# Patient Record
Sex: MALE | Race: BLACK OR AFRICAN AMERICAN | Employment: OTHER | URBAN - METROPOLITAN AREA
[De-identification: names, ages, dates, MRNs, and addresses within clinical notes are randomized per-mention and may not be internally consistent; named-entity substitution may affect disease eponyms.]

---

## 2020-01-01 ENCOUNTER — APPOINTMENT (OUTPATIENT)
Dept: CT IMAGING | Facility: CLINIC | Age: 70
End: 2020-01-01
Payer: COMMERCIAL

## 2020-01-01 ENCOUNTER — APPOINTMENT (OUTPATIENT)
Dept: GENERAL RADIOLOGY | Age: 70
DRG: 871 | End: 2020-01-01
Payer: MEDICARE

## 2020-01-01 ENCOUNTER — HOSPITAL ENCOUNTER (INPATIENT)
Age: 70
LOS: 1 days | DRG: 871 | End: 2020-07-25
Attending: EMERGENCY MEDICINE | Admitting: INTERNAL MEDICINE
Payer: MEDICARE

## 2020-01-01 ENCOUNTER — HOSPITAL ENCOUNTER (EMERGENCY)
Facility: CLINIC | Age: 70
Discharge: ANOTHER HOSPITAL | End: 2020-07-24
Attending: EMERGENCY MEDICINE
Payer: COMMERCIAL

## 2020-01-01 ENCOUNTER — HOSPITAL ENCOUNTER (OUTPATIENT)
Age: 70
Setting detail: SPECIMEN
Discharge: HOME | End: 2020-07-22
Payer: COMMERCIAL

## 2020-01-01 ENCOUNTER — APPOINTMENT (OUTPATIENT)
Dept: GENERAL RADIOLOGY | Facility: CLINIC | Age: 70
End: 2020-01-01
Payer: COMMERCIAL

## 2020-01-01 VITALS
WEIGHT: 244.71 LBS | DIASTOLIC BLOOD PRESSURE: 60 MMHG | SYSTOLIC BLOOD PRESSURE: 90 MMHG | RESPIRATION RATE: 18 BRPM | TEMPERATURE: 92.5 F | HEART RATE: 99 BPM

## 2020-01-01 VITALS
OXYGEN SATURATION: 80 % | WEIGHT: 258.82 LBS | SYSTOLIC BLOOD PRESSURE: 87 MMHG | TEMPERATURE: 97.7 F | HEART RATE: 138 BPM | DIASTOLIC BLOOD PRESSURE: 70 MMHG | HEIGHT: 76 IN | BODY MASS INDEX: 31.52 KG/M2

## 2020-01-01 LAB
-: ABNORMAL
ABO/RH: NORMAL
ABSOLUTE EOS #: 0 K/UL (ref 0–0.4)
ABSOLUTE EOS #: 0 K/UL (ref 0–0.4)
ABSOLUTE EOS #: 0.05 K/UL (ref 0–0.4)
ABSOLUTE IMMATURE GRANULOCYTE: 0 K/UL (ref 0–0.3)
ABSOLUTE IMMATURE GRANULOCYTE: 0.09 K/UL (ref 0–0.3)
ABSOLUTE IMMATURE GRANULOCYTE: ABNORMAL K/UL (ref 0–0.3)
ABSOLUTE LYMPH #: 0.59 K/UL (ref 1–4.8)
ABSOLUTE LYMPH #: 0.64 K/UL (ref 1–4.8)
ABSOLUTE LYMPH #: 1.03 K/UL (ref 1–4.8)
ABSOLUTE MONO #: 0.05 K/UL (ref 0.1–0.8)
ABSOLUTE MONO #: 0.14 K/UL (ref 0.1–0.8)
ABSOLUTE MONO #: 0.24 K/UL (ref 0.1–0.8)
ACTION: NORMAL
ALBUMIN SERPL-MCNC: 3.4 G/DL (ref 3.5–5.2)
ALBUMIN SERPL-MCNC: 3.6 G/DL (ref 3.5–5.2)
ALBUMIN SERPL-MCNC: 3.6 G/DL (ref 3.5–5.2)
ALBUMIN/GLOBULIN RATIO: 1.1 (ref 1–2.5)
ALBUMIN/GLOBULIN RATIO: 1.2 (ref 1–2.5)
ALBUMIN/GLOBULIN RATIO: 1.3 (ref 1–2.5)
ALLEN TEST: ABNORMAL
ALLEN TEST: ABNORMAL
ALLEN TEST: NEGATIVE
ALLEN TEST: POSITIVE
ALP BLD-CCNC: 92 U/L (ref 40–129)
ALP BLD-CCNC: 95 U/L (ref 40–129)
ALP BLD-CCNC: 97 U/L (ref 40–129)
ALT SERPL-CCNC: 10 U/L (ref 5–41)
ALT SERPL-CCNC: 15 U/L (ref 5–41)
ALT SERPL-CCNC: 36 U/L (ref 5–41)
AMORPHOUS: ABNORMAL
AMYLASE: 124 U/L (ref 28–100)
ANION GAP SERPL CALCULATED.3IONS-SCNC: 20 MMOL/L (ref 9–17)
ANION GAP SERPL CALCULATED.3IONS-SCNC: 32 MMOL/L (ref 9–17)
ANION GAP SERPL CALCULATED.3IONS-SCNC: 34 MMOL/L (ref 9–17)
ANION GAP SERPL CALCULATED.3IONS-SCNC: ABNORMAL MMOL/L (ref 9–17)
ANION GAP SERPL CALCULATED.3IONS-SCNC: ABNORMAL MMOL/L (ref 9–17)
ANION GAP: 21 MMOL/L (ref 7–16)
ANION GAP: 22 MMOL/L (ref 7–16)
ANTIBODY SCREEN: NEGATIVE
ARM BAND NUMBER: NORMAL
AST SERPL-CCNC: 166 U/L
AST SERPL-CCNC: 32 U/L
AST SERPL-CCNC: 52 U/L
BACTERIA: ABNORMAL
BASOPHILS # BLD: 0 % (ref 0–2)
BASOPHILS ABSOLUTE: 0 K/UL (ref 0–0.2)
BILIRUB SERPL-MCNC: 1.02 MG/DL (ref 0.3–1.2)
BILIRUB SERPL-MCNC: 1.8 MG/DL (ref 0.3–1.2)
BILIRUB SERPL-MCNC: 2.19 MG/DL (ref 0.3–1.2)
BILIRUBIN URINE: ABNORMAL
BLD PROD TYP BPU: NORMAL
BNP INTERPRETATION: ABNORMAL
BNP INTERPRETATION: ABNORMAL
BUN BLDV-MCNC: 57 MG/DL (ref 8–23)
BUN BLDV-MCNC: 58 MG/DL (ref 8–23)
BUN BLDV-MCNC: 60 MG/DL (ref 8–23)
BUN BLDV-MCNC: 61 MG/DL (ref 8–23)
BUN BLDV-MCNC: 63 MG/DL (ref 8–23)
BUN/CREAT BLD: ABNORMAL (ref 9–20)
CALCIUM SERPL-MCNC: 8.5 MG/DL (ref 8.6–10.4)
CALCIUM SERPL-MCNC: 8.6 MG/DL (ref 8.6–10.4)
CALCIUM SERPL-MCNC: 9 MG/DL (ref 8.6–10.4)
CALCIUM SERPL-MCNC: 9.4 MG/DL (ref 8.6–10.4)
CALCIUM SERPL-MCNC: 9.8 MG/DL (ref 8.6–10.4)
CASTS UA: ABNORMAL /LPF (ref 0–2)
CASTS UA: ABNORMAL /LPF (ref 0–2)
CHLORIDE BLD-SCNC: 84 MMOL/L (ref 98–107)
CHLORIDE BLD-SCNC: 85 MMOL/L (ref 98–107)
CHLORIDE BLD-SCNC: 85 MMOL/L (ref 98–107)
CHLORIDE BLD-SCNC: 86 MMOL/L (ref 98–107)
CHLORIDE BLD-SCNC: 87 MMOL/L (ref 98–107)
CHP ED QC CHECK: NORMAL
CHP ED QC CHECK: NORMAL
CHP ED QC CHECK: YES
CO2: 16 MMOL/L (ref 20–31)
CO2: 7 MMOL/L (ref 20–31)
CO2: 8 MMOL/L (ref 20–31)
CO2: <6 MMOL/L (ref 20–31)
CO2: <6 MMOL/L (ref 20–31)
COLOR: ABNORMAL
COMMENT UA: ABNORMAL
CREAT SERPL-MCNC: 2.44 MG/DL (ref 0.7–1.2)
CREAT SERPL-MCNC: 2.84 MG/DL (ref 0.7–1.2)
CREAT SERPL-MCNC: 2.84 MG/DL (ref 0.7–1.2)
CREAT SERPL-MCNC: 3 MG/DL (ref 0.7–1.2)
CREAT SERPL-MCNC: 3.08 MG/DL (ref 0.7–1.2)
CRYSTALS, UA: ABNORMAL /HPF
CULTURE: ABNORMAL
D-DIMER QUANTITATIVE: 11.69 MG/L FEU
DATE AND TIME: NORMAL
DIFFERENTIAL TYPE: ABNORMAL
DIRECT EXAM: ABNORMAL
DISPENSE STATUS BLOOD BANK: NORMAL
EKG ATRIAL RATE: 61 BPM
EKG Q-T INTERVAL: 392 MS
EKG QRS DURATION: 112 MS
EKG QTC CALCULATION (BAZETT): 515 MS
EKG R AXIS: -13 DEGREES
EKG T AXIS: -148 DEGREES
EKG VENTRICULAR RATE: 104 BPM
EOSINOPHILS RELATIVE PERCENT: 0 % (ref 1–4)
EOSINOPHILS RELATIVE PERCENT: 0 % (ref 1–4)
EOSINOPHILS RELATIVE PERCENT: 1 % (ref 1–4)
EPITHELIAL CELLS UA: ABNORMAL /HPF (ref 0–5)
EXPIRATION DATE: NORMAL
FIBRINOGEN: 117 MG/DL (ref 140–420)
FIO2: 100
FIO2: ABNORMAL
FIO2: ABNORMAL
GFR AFRICAN AMERICAN: 25 ML/MIN
GFR AFRICAN AMERICAN: 25 ML/MIN
GFR AFRICAN AMERICAN: 27 ML/MIN
GFR AFRICAN AMERICAN: 27 ML/MIN
GFR AFRICAN AMERICAN: 32 ML/MIN
GFR NON-AFRICAN AMERICAN: 19 ML/MIN
GFR NON-AFRICAN AMERICAN: 20 ML/MIN
GFR NON-AFRICAN AMERICAN: 21 ML/MIN
GFR NON-AFRICAN AMERICAN: 22 ML/MIN
GFR NON-AFRICAN AMERICAN: 27 ML/MIN
GFR SERPL CREATININE-BSD FRML MDRD: 24 ML/MIN
GFR SERPL CREATININE-BSD FRML MDRD: 26 ML/MIN
GFR SERPL CREATININE-BSD FRML MDRD: ABNORMAL ML/MIN/{1.73_M2}
GLUCOSE BLD-MCNC: 110 MG/DL
GLUCOSE BLD-MCNC: 110 MG/DL (ref 70–99)
GLUCOSE BLD-MCNC: 110 MG/DL (ref 75–110)
GLUCOSE BLD-MCNC: 111 MG/DL (ref 70–99)
GLUCOSE BLD-MCNC: 119 MG/DL (ref 75–110)
GLUCOSE BLD-MCNC: 130 MG/DL (ref 74–100)
GLUCOSE BLD-MCNC: 140 MG/DL (ref 75–110)
GLUCOSE BLD-MCNC: 141 MG/DL (ref 74–100)
GLUCOSE BLD-MCNC: 150 MG/DL
GLUCOSE BLD-MCNC: 150 MG/DL (ref 75–110)
GLUCOSE BLD-MCNC: 168 MG/DL
GLUCOSE BLD-MCNC: 168 MG/DL (ref 75–110)
GLUCOSE BLD-MCNC: 171 MG/DL (ref 74–100)
GLUCOSE BLD-MCNC: 173 MG/DL (ref 70–99)
GLUCOSE BLD-MCNC: 197 MG/DL (ref 74–100)
GLUCOSE BLD-MCNC: 208 MG/DL (ref 70–99)
GLUCOSE BLD-MCNC: 73 MG/DL (ref 75–110)
GLUCOSE BLD-MCNC: 94 MG/DL (ref 70–99)
GLUCOSE URINE: NEGATIVE
HCO3 VENOUS: 9 MMOL/L (ref 22–29)
HCO3 VENOUS: 9 MMOL/L (ref 24–30)
HCO3 VENOUS: 9.2 MMOL/L (ref 22–29)
HCO3 VENOUS: 9.9 MMOL/L (ref 22–29)
HCT VFR BLD CALC: 36.6 % (ref 40.7–50.3)
HCT VFR BLD CALC: 37.4 % (ref 40.7–50.3)
HCT VFR BLD CALC: 38 % (ref 40.7–50.3)
HCT VFR BLD CALC: 38.3 % (ref 40.7–50.3)
HCT VFR BLD CALC: 40.9 % (ref 41–53)
HCT VFR BLD CALC: 43.4 % (ref 40.7–50.3)
HEMOGLOBIN: 10.8 G/DL (ref 13–17)
HEMOGLOBIN: 10.9 G/DL (ref 13–17)
HEMOGLOBIN: 10.9 G/DL (ref 13–17)
HEMOGLOBIN: 11.2 G/DL (ref 13–17)
HEMOGLOBIN: 12 G/DL (ref 13.5–17.5)
HEMOGLOBIN: 12.2 G/DL (ref 13–17)
IMMATURE GRANULOCYTES: 0 %
IMMATURE GRANULOCYTES: 2 %
IMMATURE GRANULOCYTES: ABNORMAL %
INR BLD: 3.4
INR BLD: 4.2
INR BLD: 8.2
KETONES, URINE: ABNORMAL
LACTIC ACID, SEPSIS WHOLE BLOOD: 18 MMOL/L (ref 0.5–1.9)
LACTIC ACID, SEPSIS WHOLE BLOOD: 20 MMOL/L (ref 0.5–1.9)
LACTIC ACID, SEPSIS WHOLE BLOOD: 21 MMOL/L (ref 0.5–1.9)
LACTIC ACID, SEPSIS WHOLE BLOOD: 21 MMOL/L (ref 0.5–1.9)
LACTIC ACID, SEPSIS: ABNORMAL MMOL/L (ref 0.5–1.9)
LACTIC ACID, WHOLE BLOOD: 18 MMOL/L (ref 0.7–2.1)
LACTIC ACID, WHOLE BLOOD: 19 MMOL/L (ref 0.7–2.1)
LACTIC ACID: 15 MMOL/L (ref 0.5–2.2)
LACTIC ACID: 16.6 MMOL/L (ref 0.5–2.2)
LACTIC ACID: ABNORMAL MMOL/L
LEUKOCYTE ESTERASE, URINE: ABNORMAL
LIPASE: 62 U/L (ref 13–60)
LV EF: 10 %
LVEF MODALITY: NORMAL
LYMPHOCYTES # BLD: 13 % (ref 24–44)
LYMPHOCYTES # BLD: 22 % (ref 24–44)
LYMPHOCYTES # BLD: 23 % (ref 24–44)
Lab: ABNORMAL
Lab: ABNORMAL
MCH RBC QN AUTO: 28.6 PG (ref 25.2–33.5)
MCH RBC QN AUTO: 29 PG (ref 26–34)
MCH RBC QN AUTO: 29.1 PG (ref 25.2–33.5)
MCH RBC QN AUTO: 29.6 PG (ref 25.2–33.5)
MCHC RBC AUTO-ENTMCNC: 28.4 G/DL (ref 28.4–34.8)
MCHC RBC AUTO-ENTMCNC: 28.5 G/DL (ref 28.4–34.8)
MCHC RBC AUTO-ENTMCNC: 29.3 G/DL (ref 31–37)
MCHC RBC AUTO-ENTMCNC: 30.6 G/DL (ref 28.4–34.8)
MCV RBC AUTO: 102.4 FL (ref 82.6–102.9)
MCV RBC AUTO: 104.1 FL (ref 82.6–102.9)
MCV RBC AUTO: 93.4 FL (ref 82.6–102.9)
MCV RBC AUTO: 98.9 FL (ref 80–100)
MODE: ABNORMAL
MONOCYTES # BLD: 1 % (ref 1–7)
MONOCYTES # BLD: 5 % (ref 1–7)
MONOCYTES # BLD: 5 % (ref 1–7)
MORPHOLOGY: ABNORMAL
MUCUS: ABNORMAL
MYOGLOBIN: 546 NG/ML (ref 28–72)
MYOGLOBIN: 575 NG/ML (ref 28–72)
NEGATIVE BASE EXCESS, ART: 21 (ref 0–2)
NEGATIVE BASE EXCESS, VEN: 21 (ref 0–2)
NEGATIVE BASE EXCESS, VEN: 22 (ref 0–2)
NEGATIVE BASE EXCESS, VEN: 24 (ref 0–2)
NEGATIVE BASE EXCESS, VEN: 24 (ref 0–2)
NITRITE, URINE: NEGATIVE
NOTIFY: NORMAL
NRBC AUTOMATED: 1.5 PER 100 WBC
NRBC AUTOMATED: 4.9 PER 100 WBC
NRBC AUTOMATED: 6 PER 100 WBC
NRBC AUTOMATED: ABNORMAL PER 100 WBC
NUCLEATED RED BLOOD CELLS: 3 PER 100 WBC
NUCLEATED RED BLOOD CELLS: 7 PER 100 WBC
NUCLEATED RED BLOOD CELLS: 7 PER 100 WBC
O2 DEVICE/FLOW/%: ABNORMAL
O2 SAT, VEN: 42 % (ref 60–85)
O2 SAT, VEN: 59 % (ref 60–85)
O2 SAT, VEN: 64 % (ref 60–85)
O2 SAT, VEN: 68 %
OTHER OBSERVATIONS UA: ABNORMAL
PARTIAL THROMBOPLASTIN TIME: 87.1 SEC (ref 20.5–30.5)
PATIENT TEMP: 33.5
PATIENT TEMP: ABNORMAL
PCO2, VEN: 34 MM HG (ref 41–51)
PCO2, VEN: 36 MM HG (ref 39–55)
PCO2, VEN: 52.6 MM HG (ref 41–51)
PCO2, VEN: 54.2 MM HG (ref 41–51)
PDW BLD-RTO: 20.7 % (ref 11.8–14.4)
PDW BLD-RTO: 21.3 % (ref 11.8–14.4)
PDW BLD-RTO: 21.8 % (ref 11.8–14.4)
PDW BLD-RTO: 22.1 % (ref 12.5–15.4)
PH UA: 5 (ref 5–8)
PH VENOUS: 6.85 (ref 7.32–7.43)
PH VENOUS: 6.87 (ref 7.32–7.43)
PH VENOUS: 7 (ref 7.32–7.42)
PH VENOUS: 7.03 (ref 7.32–7.43)
PLATELET # BLD: 120 K/UL (ref 138–453)
PLATELET # BLD: 150 K/UL (ref 138–453)
PLATELET # BLD: 154 K/UL (ref 140–450)
PLATELET # BLD: 175 K/UL (ref 138–453)
PLATELET ESTIMATE: ABNORMAL
PMV BLD AUTO: 11.4 FL (ref 8.1–13.5)
PMV BLD AUTO: 11.6 FL (ref 8.1–13.5)
PMV BLD AUTO: 11.7 FL (ref 8.1–13.5)
PMV BLD AUTO: 9.2 FL (ref 6–12)
PO2, VEN: 40.5 MM HG (ref 30–50)
PO2, VEN: 44.2 MM HG (ref 30–50)
PO2, VEN: 52 MM HG (ref 30–50)
PO2, VEN: 58.3 MM HG (ref 30–50)
POC CHLORIDE: 96 MMOL/L (ref 98–107)
POC CHLORIDE: 98 MMOL/L (ref 98–107)
POC CREATININE: 2.93 MG/DL (ref 0.51–1.19)
POC CREATININE: 3.19 MG/DL (ref 0.51–1.19)
POC HCO3: 8.5 MMOL/L (ref 21–28)
POC HEMATOCRIT: 39 % (ref 41–53)
POC HEMATOCRIT: 39 % (ref 41–53)
POC HEMOGLOBIN: 13.1 G/DL (ref 13.5–17.5)
POC HEMOGLOBIN: 13.1 G/DL (ref 13.5–17.5)
POC IONIZED CALCIUM: 0.98 MMOL/L (ref 1.15–1.33)
POC IONIZED CALCIUM: 1.05 MMOL/L (ref 1.15–1.33)
POC LACTIC ACID: 12.3 MMOL/L (ref 0.56–1.39)
POC LACTIC ACID: 17.46 MMOL/L (ref 0.56–1.39)
POC LACTIC ACID: 18.1 MMOL/L (ref 0.56–1.39)
POC O2 SATURATION: 76 % (ref 94–98)
POC PCO2 TEMP: 47 MM HG
POC PCO2 TEMP: ABNORMAL MM HG
POC PCO2: 32.6 MM HG (ref 35–48)
POC PH TEMP: 6.91
POC PH TEMP: ABNORMAL
POC PH: 7.02 (ref 7.35–7.45)
POC PO2 TEMP: 32 MM HG
POC PO2 TEMP: ABNORMAL MM HG
POC PO2: 58.8 MM HG (ref 83–108)
POC POTASSIUM: 4.3 MMOL/L (ref 3.5–4.5)
POC POTASSIUM: 4.5 MMOL/L (ref 3.5–4.5)
POC SODIUM: 126 MMOL/L (ref 138–146)
POC SODIUM: 128 MMOL/L (ref 138–146)
POSITIVE BASE EXCESS, ART: ABNORMAL (ref 0–3)
POSITIVE BASE EXCESS, VEN: ABNORMAL (ref 0–2)
POSITIVE BASE EXCESS, VEN: ABNORMAL (ref 0–3)
POTASSIUM SERPL-SCNC: 4.6 MMOL/L (ref 3.7–5.3)
POTASSIUM SERPL-SCNC: 4.8 MMOL/L (ref 3.7–5.3)
POTASSIUM SERPL-SCNC: 5 MMOL/L (ref 3.7–5.3)
POTASSIUM SERPL-SCNC: 5.1 MMOL/L (ref 3.7–5.3)
POTASSIUM SERPL-SCNC: 5.2 MMOL/L (ref 3.7–5.3)
PRO-BNP: ABNORMAL PG/ML
PRO-BNP: ABNORMAL PG/ML
PROTEIN UA: ABNORMAL
PROTHROMBIN TIME: 33.6 SEC (ref 9–12)
PROTHROMBIN TIME: 42.1 SEC (ref 9–12)
PROTHROMBIN TIME: 79.3 SEC (ref 9–12)
RBC # BLD: 3.68 M/UL (ref 4.21–5.77)
RBC # BLD: 3.71 M/UL (ref 4.21–5.77)
RBC # BLD: 3.92 M/UL (ref 4.21–5.77)
RBC # BLD: 4.14 M/UL (ref 4.5–5.9)
RBC # BLD: ABNORMAL 10*6/UL
RBC UA: ABNORMAL /HPF (ref 0–2)
READ BACK: YES
RENAL EPITHELIAL, UA: ABNORMAL /HPF
SAMPLE SITE: ABNORMAL
SARS-COV-2, PCR: NORMAL
SARS-COV-2, RAPID: NOT DETECTED
SARS-COV-2: NORMAL
SEG NEUTROPHILS: 70 % (ref 36–66)
SEG NEUTROPHILS: 72 % (ref 36–66)
SEG NEUTROPHILS: 86 % (ref 36–66)
SEGMENTED NEUTROPHILS ABSOLUTE COUNT: 2.02 K/UL (ref 1.8–7.7)
SEGMENTED NEUTROPHILS ABSOLUTE COUNT: 3.29 K/UL (ref 1.8–7.7)
SEGMENTED NEUTROPHILS ABSOLUTE COUNT: 3.86 K/UL (ref 1.8–7.7)
SODIUM BLD-SCNC: 123 MMOL/L (ref 135–144)
SODIUM BLD-SCNC: 125 MMOL/L (ref 135–144)
SODIUM BLD-SCNC: 127 MMOL/L (ref 135–144)
SOURCE: NORMAL
SPECIFIC GRAVITY UA: 1.02 (ref 1–1.03)
SPECIMEN DESCRIPTION: ABNORMAL
SPECIMEN DESCRIPTION: ABNORMAL
T4 TOTAL: 4.4 UG/DL (ref 4.5–10.9)
TCO2 (CALC), ART: 10 MMOL/L (ref 22–29)
THYROXINE, FREE: 1.09 NG/DL (ref 0.93–1.7)
TOTAL CK: 268 U/L (ref 39–308)
TOTAL CK: 308 U/L (ref 39–308)
TOTAL CK: 322 U/L (ref 39–308)
TOTAL CO2, VENOUS: 10 MMOL/L (ref 23–30)
TOTAL CO2, VENOUS: 10 MMOL/L (ref 25–31)
TOTAL CO2, VENOUS: 11 MMOL/L (ref 23–30)
TOTAL CO2, VENOUS: 12 MMOL/L (ref 23–30)
TOTAL PROTEIN: 6.3 G/DL (ref 6.4–8.3)
TOTAL PROTEIN: 6.3 G/DL (ref 6.4–8.3)
TOTAL PROTEIN: 6.8 G/DL (ref 6.4–8.3)
TRANSFUSION STATUS: NORMAL
TRICHOMONAS: ABNORMAL
TROPONIN INTERP: ABNORMAL
TROPONIN T: ABNORMAL NG/ML
TROPONIN, HIGH SENSITIVITY: 223 NG/L (ref 0–22)
TROPONIN, HIGH SENSITIVITY: 230 NG/L (ref 0–22)
TROPONIN, HIGH SENSITIVITY: 245 NG/L (ref 0–22)
TROPONIN, HIGH SENSITIVITY: 248 NG/L (ref 0–22)
TROPONIN, HIGH SENSITIVITY: 254 NG/L (ref 0–22)
TROPONIN, HIGH SENSITIVITY: 259 NG/L (ref 0–22)
TROPONIN, HIGH SENSITIVITY: 305 NG/L (ref 0–22)
TSH SERPL DL<=0.05 MIU/L-ACNC: 29 MIU/L (ref 0.3–5)
TURBIDITY: ABNORMAL
UNIT DIVISION: 0
UNIT NUMBER: NORMAL
URINE HGB: ABNORMAL
UROBILINOGEN, URINE: NORMAL
WBC # BLD: 2.8 K/UL (ref 3.5–11)
WBC # BLD: 3.3 K/UL (ref 3.5–11.3)
WBC # BLD: 4.5 K/UL (ref 3.5–11.3)
WBC # BLD: 4.7 K/UL (ref 3.5–11.3)
WBC # BLD: ABNORMAL 10*3/UL
WBC UA: ABNORMAL /HPF (ref 0–5)
YEAST: ABNORMAL

## 2020-01-01 PROCEDURE — 82947 ASSAY GLUCOSE BLOOD QUANT: CPT

## 2020-01-01 PROCEDURE — C9113 INJ PANTOPRAZOLE SODIUM, VIA: HCPCS | Performed by: STUDENT IN AN ORGANIZED HEALTH CARE EDUCATION/TRAINING PROGRAM

## 2020-01-01 PROCEDURE — 6370000000 HC RX 637 (ALT 250 FOR IP): Performed by: STUDENT IN AN ORGANIZED HEALTH CARE EDUCATION/TRAINING PROGRAM

## 2020-01-01 PROCEDURE — 84295 ASSAY OF SERUM SODIUM: CPT

## 2020-01-01 PROCEDURE — 2700000000 HC OXYGEN THERAPY PER DAY

## 2020-01-01 PROCEDURE — 2500000003 HC RX 250 WO HCPCS: Performed by: STUDENT IN AN ORGANIZED HEALTH CARE EDUCATION/TRAINING PROGRAM

## 2020-01-01 PROCEDURE — 94761 N-INVAS EAR/PLS OXIMETRY MLT: CPT

## 2020-01-01 PROCEDURE — 74176 CT ABD & PELVIS W/O CONTRAST: CPT

## 2020-01-01 PROCEDURE — 86901 BLOOD TYPING SEROLOGIC RH(D): CPT

## 2020-01-01 PROCEDURE — 83880 ASSAY OF NATRIURETIC PEPTIDE: CPT

## 2020-01-01 PROCEDURE — P9012 CRYOPRECIPITATE EACH UNIT: HCPCS

## 2020-01-01 PROCEDURE — 2580000003 HC RX 258

## 2020-01-01 PROCEDURE — 87086 URINE CULTURE/COLONY COUNT: CPT

## 2020-01-01 PROCEDURE — 36430 TRANSFUSION BLD/BLD COMPNT: CPT

## 2020-01-01 PROCEDURE — 2500000003 HC RX 250 WO HCPCS

## 2020-01-01 PROCEDURE — 87040 BLOOD CULTURE FOR BACTERIA: CPT

## 2020-01-01 PROCEDURE — 0BH17EZ INSERTION OF ENDOTRACHEAL AIRWAY INTO TRACHEA, VIA NATURAL OR ARTIFICIAL OPENING: ICD-10-PCS | Performed by: INTERNAL MEDICINE

## 2020-01-01 PROCEDURE — 84132 ASSAY OF SERUM POTASSIUM: CPT

## 2020-01-01 PROCEDURE — 85384 FIBRINOGEN ACTIVITY: CPT

## 2020-01-01 PROCEDURE — 85379 FIBRIN DEGRADATION QUANT: CPT

## 2020-01-01 PROCEDURE — 2580000003 HC RX 258: Performed by: STUDENT IN AN ORGANIZED HEALTH CARE EDUCATION/TRAINING PROGRAM

## 2020-01-01 PROCEDURE — 85025 COMPLETE CBC W/AUTO DIFF WBC: CPT

## 2020-01-01 PROCEDURE — 82803 BLOOD GASES ANY COMBINATION: CPT

## 2020-01-01 PROCEDURE — 85018 HEMOGLOBIN: CPT

## 2020-01-01 PROCEDURE — 85610 PROTHROMBIN TIME: CPT

## 2020-01-01 PROCEDURE — 82550 ASSAY OF CK (CPK): CPT

## 2020-01-01 PROCEDURE — 82150 ASSAY OF AMYLASE: CPT

## 2020-01-01 PROCEDURE — 81001 URINALYSIS AUTO W/SCOPE: CPT

## 2020-01-01 PROCEDURE — 70450 CT HEAD/BRAIN W/O DYE: CPT

## 2020-01-01 PROCEDURE — 84439 ASSAY OF FREE THYROXINE: CPT

## 2020-01-01 PROCEDURE — 36415 COLL VENOUS BLD VENIPUNCTURE: CPT

## 2020-01-01 PROCEDURE — 83605 ASSAY OF LACTIC ACID: CPT

## 2020-01-01 PROCEDURE — 96365 THER/PROPH/DIAG IV INF INIT: CPT

## 2020-01-01 PROCEDURE — 99291 CRITICAL CARE FIRST HOUR: CPT | Performed by: INTERNAL MEDICINE

## 2020-01-01 PROCEDURE — 85014 HEMATOCRIT: CPT

## 2020-01-01 PROCEDURE — 87641 MR-STAPH DNA AMP PROBE: CPT

## 2020-01-01 PROCEDURE — 96375 TX/PRO/DX INJ NEW DRUG ADDON: CPT

## 2020-01-01 PROCEDURE — 87070 CULTURE OTHR SPECIMN AEROBIC: CPT

## 2020-01-01 PROCEDURE — 94770 HC ETCO2 MONITOR DAILY: CPT

## 2020-01-01 PROCEDURE — 84484 ASSAY OF TROPONIN QUANT: CPT

## 2020-01-01 PROCEDURE — 36556 INSERT NON-TUNNEL CV CATH: CPT

## 2020-01-01 PROCEDURE — 36600 WITHDRAWAL OF ARTERIAL BLOOD: CPT

## 2020-01-01 PROCEDURE — 6360000002 HC RX W HCPCS: Performed by: INTERNAL MEDICINE

## 2020-01-01 PROCEDURE — P9017 PLASMA 1 DONOR FRZ W/IN 8 HR: HCPCS

## 2020-01-01 PROCEDURE — 6360000002 HC RX W HCPCS: Performed by: STUDENT IN AN ORGANIZED HEALTH CARE EDUCATION/TRAINING PROGRAM

## 2020-01-01 PROCEDURE — 83874 ASSAY OF MYOGLOBIN: CPT

## 2020-01-01 PROCEDURE — 82435 ASSAY OF BLOOD CHLORIDE: CPT

## 2020-01-01 PROCEDURE — 3E033XZ INTRODUCTION OF VASOPRESSOR INTO PERIPHERAL VEIN, PERCUTANEOUS APPROACH: ICD-10-PCS | Performed by: INTERNAL MEDICINE

## 2020-01-01 PROCEDURE — 86927 PLASMA FRESH FROZEN: CPT

## 2020-01-01 PROCEDURE — 85730 THROMBOPLASTIN TIME PARTIAL: CPT

## 2020-01-01 PROCEDURE — 99285 EMERGENCY DEPT VISIT HI MDM: CPT

## 2020-01-01 PROCEDURE — 2580000003 HC RX 258: Performed by: EMERGENCY MEDICINE

## 2020-01-01 PROCEDURE — 71045 X-RAY EXAM CHEST 1 VIEW: CPT

## 2020-01-01 PROCEDURE — 85027 COMPLETE CBC AUTOMATED: CPT

## 2020-01-01 PROCEDURE — 93005 ELECTROCARDIOGRAM TRACING: CPT | Performed by: STUDENT IN AN ORGANIZED HEALTH CARE EDUCATION/TRAINING PROGRAM

## 2020-01-01 PROCEDURE — 82330 ASSAY OF CALCIUM: CPT

## 2020-01-01 PROCEDURE — 96374 THER/PROPH/DIAG INJ IV PUSH: CPT

## 2020-01-01 PROCEDURE — U0002 COVID-19 LAB TEST NON-CDC: HCPCS

## 2020-01-01 PROCEDURE — 5A1945Z RESPIRATORY VENTILATION, 24-96 CONSECUTIVE HOURS: ICD-10-PCS | Performed by: INTERNAL MEDICINE

## 2020-01-01 PROCEDURE — 84436 ASSAY OF TOTAL THYROXINE: CPT

## 2020-01-01 PROCEDURE — 6360000002 HC RX W HCPCS

## 2020-01-01 PROCEDURE — 82565 ASSAY OF CREATININE: CPT

## 2020-01-01 PROCEDURE — 93308 TTE F-UP OR LMTD: CPT

## 2020-01-01 PROCEDURE — 80053 COMPREHEN METABOLIC PANEL: CPT

## 2020-01-01 PROCEDURE — 84443 ASSAY THYROID STIM HORMONE: CPT

## 2020-01-01 PROCEDURE — 31500 INSERT EMERGENCY AIRWAY: CPT

## 2020-01-01 PROCEDURE — 94003 VENT MGMT INPAT SUBQ DAY: CPT

## 2020-01-01 PROCEDURE — 87205 SMEAR GRAM STAIN: CPT

## 2020-01-01 PROCEDURE — 83690 ASSAY OF LIPASE: CPT

## 2020-01-01 PROCEDURE — 86900 BLOOD TYPING SEROLOGIC ABO: CPT

## 2020-01-01 PROCEDURE — 80048 BASIC METABOLIC PNL TOTAL CA: CPT

## 2020-01-01 PROCEDURE — 87150 DNA/RNA AMPLIFIED PROBE: CPT

## 2020-01-01 PROCEDURE — P9603 ONE-WAY ALLOW PRORATED MILES: HCPCS

## 2020-01-01 PROCEDURE — 99233 SBSQ HOSP IP/OBS HIGH 50: CPT | Performed by: INTERNAL MEDICINE

## 2020-01-01 PROCEDURE — P9047 ALBUMIN (HUMAN), 25%, 50ML: HCPCS | Performed by: INTERNAL MEDICINE

## 2020-01-01 PROCEDURE — 2500000003 HC RX 250 WO HCPCS: Performed by: EMERGENCY MEDICINE

## 2020-01-01 PROCEDURE — 93005 ELECTROCARDIOGRAM TRACING: CPT | Performed by: EMERGENCY MEDICINE

## 2020-01-01 PROCEDURE — 6360000002 HC RX W HCPCS: Performed by: EMERGENCY MEDICINE

## 2020-01-01 PROCEDURE — 86850 RBC ANTIBODY SCREEN: CPT

## 2020-01-01 PROCEDURE — 99222 1ST HOSP IP/OBS MODERATE 55: CPT | Performed by: FAMILY MEDICINE

## 2020-01-01 PROCEDURE — 2000000000 HC ICU R&B

## 2020-01-01 PROCEDURE — 94002 VENT MGMT INPAT INIT DAY: CPT

## 2020-01-01 RX ORDER — DEXTROSE MONOHYDRATE 25 G/50ML
12.5 INJECTION, SOLUTION INTRAVENOUS PRN
Status: DISCONTINUED | OUTPATIENT
Start: 2020-01-01 | End: 2020-01-01

## 2020-01-01 RX ORDER — DOPAMINE HYDROCHLORIDE 160 MG/100ML
INJECTION, SOLUTION INTRAVENOUS
Status: DISCONTINUED
Start: 2020-01-01 | End: 2020-01-01 | Stop reason: HOSPADM

## 2020-01-01 RX ORDER — ALBUMIN (HUMAN) 12.5 G/50ML
25 SOLUTION INTRAVENOUS ONCE
Status: COMPLETED | OUTPATIENT
Start: 2020-01-01 | End: 2020-01-01

## 2020-01-01 RX ORDER — LORAZEPAM 2 MG/ML
INJECTION INTRAMUSCULAR
Status: DISCONTINUED
Start: 2020-01-01 | End: 2020-01-01 | Stop reason: HOSPADM

## 2020-01-01 RX ORDER — DEXTROSE MONOHYDRATE 100 MG/ML
INJECTION, SOLUTION INTRAVENOUS CONTINUOUS
Status: DISCONTINUED | OUTPATIENT
Start: 2020-01-01 | End: 2020-01-01

## 2020-01-01 RX ORDER — ACETAMINOPHEN 325 MG/1
650 TABLET ORAL EVERY 6 HOURS PRN
Status: DISCONTINUED | OUTPATIENT
Start: 2020-01-01 | End: 2020-01-01 | Stop reason: HOSPADM

## 2020-01-01 RX ORDER — DOPAMINE HYDROCHLORIDE 160 MG/100ML
2.5 INJECTION, SOLUTION INTRAVENOUS CONTINUOUS
Status: DISCONTINUED | OUTPATIENT
Start: 2020-01-01 | End: 2020-01-01 | Stop reason: HOSPADM

## 2020-01-01 RX ORDER — SODIUM CHLORIDE 9 MG/ML
10 INJECTION INTRAVENOUS 2 TIMES DAILY
Status: DISCONTINUED | OUTPATIENT
Start: 2020-01-01 | End: 2020-01-01

## 2020-01-01 RX ORDER — LEVOTHYROXINE SODIUM ANHYDROUS 200 UG/5ML
75 INJECTION, POWDER, LYOPHILIZED, FOR SOLUTION INTRAVENOUS ONCE
Status: DISCONTINUED | OUTPATIENT
Start: 2020-01-01 | End: 2020-01-01

## 2020-01-01 RX ORDER — MAGNESIUM OXIDE 400 MG/1
400 TABLET ORAL DAILY
COMMUNITY

## 2020-01-01 RX ORDER — 0.9 % SODIUM CHLORIDE 0.9 %
1000 INTRAVENOUS SOLUTION INTRAVENOUS ONCE
Status: COMPLETED | OUTPATIENT
Start: 2020-01-01 | End: 2020-01-01

## 2020-01-01 RX ORDER — LEVOTHYROXINE SODIUM ANHYDROUS 100 UG/5ML
75 INJECTION, POWDER, LYOPHILIZED, FOR SOLUTION INTRAVENOUS ONCE
Status: COMPLETED | OUTPATIENT
Start: 2020-01-01 | End: 2020-01-01

## 2020-01-01 RX ORDER — ACETAMINOPHEN 650 MG/1
650 SUPPOSITORY RECTAL EVERY 6 HOURS PRN
Status: DISCONTINUED | OUTPATIENT
Start: 2020-01-01 | End: 2020-01-01 | Stop reason: HOSPADM

## 2020-01-01 RX ORDER — GINSENG 100 MG
CAPSULE ORAL 2 TIMES DAILY
Status: DISCONTINUED | OUTPATIENT
Start: 2020-01-01 | End: 2020-01-01

## 2020-01-01 RX ORDER — DEXTROSE MONOHYDRATE 50 MG/ML
100 INJECTION, SOLUTION INTRAVENOUS PRN
Status: DISCONTINUED | OUTPATIENT
Start: 2020-01-01 | End: 2020-01-01

## 2020-01-01 RX ORDER — MORPHINE SULFATE 2 MG/ML
2 INJECTION, SOLUTION INTRAMUSCULAR; INTRAVENOUS
Status: DISCONTINUED | OUTPATIENT
Start: 2020-01-01 | End: 2020-01-01 | Stop reason: HOSPADM

## 2020-01-01 RX ORDER — MIDAZOLAM HYDROCHLORIDE 1 MG/ML
INJECTION INTRAMUSCULAR; INTRAVENOUS
Status: COMPLETED
Start: 2020-01-01 | End: 2020-01-01

## 2020-01-01 RX ORDER — SODIUM CHLORIDE 0.9 % (FLUSH) 0.9 %
10 SYRINGE (ML) INJECTION PRN
Status: DISCONTINUED | OUTPATIENT
Start: 2020-01-01 | End: 2020-01-01

## 2020-01-01 RX ORDER — ONDANSETRON 4 MG/1
4 TABLET, FILM COATED ORAL EVERY 8 HOURS PRN
COMMUNITY

## 2020-01-01 RX ORDER — MIDODRINE HYDROCHLORIDE 5 MG/1
5 TABLET ORAL 3 TIMES DAILY
Status: DISCONTINUED | OUTPATIENT
Start: 2020-01-01 | End: 2020-01-01

## 2020-01-01 RX ORDER — LEVOTHYROXINE SODIUM 112 UG/1
112 TABLET ORAL DAILY
COMMUNITY

## 2020-01-01 RX ORDER — LORAZEPAM 2 MG/ML
0.5 INJECTION INTRAMUSCULAR
Status: DISCONTINUED | OUTPATIENT
Start: 2020-01-01 | End: 2020-01-01 | Stop reason: HOSPADM

## 2020-01-01 RX ORDER — MIDAZOLAM HYDROCHLORIDE 1 MG/ML
2 INJECTION INTRAMUSCULAR; INTRAVENOUS ONCE
Status: COMPLETED | OUTPATIENT
Start: 2020-01-01 | End: 2020-01-01

## 2020-01-01 RX ORDER — ALBUMIN, HUMAN INJ 5% 5 %
25 SOLUTION INTRAVENOUS ONCE
Status: DISCONTINUED | OUTPATIENT
Start: 2020-01-01 | End: 2020-01-01

## 2020-01-01 RX ORDER — SODIUM CHLORIDE 0.9 % (FLUSH) 0.9 %
10 SYRINGE (ML) INJECTION EVERY 12 HOURS SCHEDULED
Status: DISCONTINUED | OUTPATIENT
Start: 2020-01-01 | End: 2020-01-01

## 2020-01-01 RX ORDER — ACETAMINOPHEN 325 MG/1
650 TABLET ORAL EVERY 6 HOURS PRN
COMMUNITY

## 2020-01-01 RX ORDER — LEVOTHYROXINE SODIUM ANHYDROUS 100 UG/5ML
62.5 INJECTION, POWDER, LYOPHILIZED, FOR SOLUTION INTRAVENOUS DAILY
Status: DISCONTINUED | OUTPATIENT
Start: 2020-01-01 | End: 2020-01-01

## 2020-01-01 RX ORDER — SODIUM CHLORIDE 9 MG/ML
5 INJECTION INTRAVENOUS ONCE
Status: COMPLETED | OUTPATIENT
Start: 2020-01-01 | End: 2020-01-01

## 2020-01-01 RX ORDER — EPINEPHRINE 0.1 MG/ML
SYRINGE (ML) INJECTION
Status: DISPENSED
Start: 2020-01-01 | End: 2020-01-01

## 2020-01-01 RX ORDER — LANOLIN ALCOHOL/MO/W.PET/CERES
3 CREAM (GRAM) TOPICAL NIGHTLY PRN
COMMUNITY

## 2020-01-01 RX ORDER — CHLORHEXIDINE GLUCONATE 0.12 MG/ML
15 RINSE ORAL 2 TIMES DAILY
Status: DISCONTINUED | OUTPATIENT
Start: 2020-01-01 | End: 2020-01-01

## 2020-01-01 RX ORDER — INSULIN GLARGINE 100 [IU]/ML
10 INJECTION, SOLUTION SUBCUTANEOUS NIGHTLY
COMMUNITY

## 2020-01-01 RX ORDER — NICOTINE POLACRILEX 4 MG
15 LOZENGE BUCCAL PRN
Status: DISCONTINUED | OUTPATIENT
Start: 2020-01-01 | End: 2020-01-01

## 2020-01-01 RX ORDER — ASCORBIC ACID 500 MG
500 TABLET ORAL DAILY
COMMUNITY

## 2020-01-01 RX ORDER — MIDODRINE HYDROCHLORIDE 5 MG/1
5 TABLET ORAL 3 TIMES DAILY
COMMUNITY

## 2020-01-01 RX ORDER — PANTOPRAZOLE SODIUM 40 MG/10ML
40 INJECTION, POWDER, LYOPHILIZED, FOR SOLUTION INTRAVENOUS 2 TIMES DAILY
Status: DISCONTINUED | OUTPATIENT
Start: 2020-01-01 | End: 2020-01-01

## 2020-01-01 RX ORDER — BUMETANIDE 1 MG/1
1 TABLET ORAL 2 TIMES DAILY
COMMUNITY

## 2020-01-01 RX ORDER — MIRTAZAPINE 15 MG/1
15 TABLET, FILM COATED ORAL NIGHTLY
COMMUNITY

## 2020-01-01 RX ORDER — 0.9 % SODIUM CHLORIDE 0.9 %
20 INTRAVENOUS SOLUTION INTRAVENOUS ONCE
Status: DISCONTINUED | OUTPATIENT
Start: 2020-01-01 | End: 2020-01-01

## 2020-01-01 RX ORDER — DOPAMINE HYDROCHLORIDE 160 MG/100ML
2.5 INJECTION, SOLUTION INTRAVENOUS CONTINUOUS
Status: DISCONTINUED | OUTPATIENT
Start: 2020-01-01 | End: 2020-01-01 | Stop reason: SDUPTHER

## 2020-01-01 RX ORDER — 0.9 % SODIUM CHLORIDE 0.9 %
250 INTRAVENOUS SOLUTION INTRAVENOUS ONCE
Status: COMPLETED | OUTPATIENT
Start: 2020-01-01 | End: 2020-01-01

## 2020-01-01 RX ORDER — ASPIRIN 81 MG/1
81 TABLET, CHEWABLE ORAL DAILY
COMMUNITY

## 2020-01-01 RX ORDER — MORPHINE SULFATE 4 MG/ML
4 INJECTION, SOLUTION INTRAMUSCULAR; INTRAVENOUS
Status: DISCONTINUED | OUTPATIENT
Start: 2020-01-01 | End: 2020-01-01 | Stop reason: HOSPADM

## 2020-01-01 RX ORDER — NOREPINEPHRINE BIT/0.9 % NACL 16MG/250ML
2 INFUSION BOTTLE (ML) INTRAVENOUS CONTINUOUS
Status: DISCONTINUED | OUTPATIENT
Start: 2020-01-01 | End: 2020-01-01

## 2020-01-01 RX ORDER — SODIUM CHLORIDE 9 MG/ML
5 INJECTION INTRAVENOUS DAILY
Status: DISCONTINUED | OUTPATIENT
Start: 2020-01-01 | End: 2020-01-01

## 2020-01-01 RX ORDER — GLYCOPYRROLATE 0.2 MG/ML
0.2 INJECTION INTRAMUSCULAR; INTRAVENOUS
Status: DISCONTINUED | OUTPATIENT
Start: 2020-01-01 | End: 2020-01-01 | Stop reason: HOSPADM

## 2020-01-01 RX ADMIN — CHLORHEXIDINE GLUCONATE 15 ML: 1.2 RINSE ORAL at 08:30

## 2020-01-01 RX ADMIN — LEVOTHYROXINE SODIUM ANHYDROUS 62.5 MCG: 100 INJECTION, POWDER, LYOPHILIZED, FOR SOLUTION INTRAVENOUS at 06:38

## 2020-01-01 RX ADMIN — VASOPRESSIN 0.03 UNITS/MIN: 20 INJECTION INTRAVENOUS at 09:15

## 2020-01-01 RX ADMIN — PHENYLEPHRINE HYDROCHLORIDE 100 MCG/MIN: 10 INJECTION INTRAVENOUS at 14:06

## 2020-01-01 RX ADMIN — MORPHINE SULFATE 2 MG: 2 INJECTION, SOLUTION INTRAMUSCULAR; INTRAVENOUS at 13:38

## 2020-01-01 RX ADMIN — PHENYLEPHRINE HYDROCHLORIDE 300 MCG/MIN: 10 INJECTION INTRAVENOUS at 01:13

## 2020-01-01 RX ADMIN — PHENYLEPHRINE HYDROCHLORIDE 300 MCG/MIN: 10 INJECTION INTRAVENOUS at 06:55

## 2020-01-01 RX ADMIN — HYDROCORTISONE SODIUM SUCCINATE 100 MG: 100 INJECTION, POWDER, FOR SOLUTION INTRAMUSCULAR; INTRAVENOUS at 01:14

## 2020-01-01 RX ADMIN — VASOPRESSIN 0.03 UNITS/MIN: 20 INJECTION INTRAVENOUS at 07:44

## 2020-01-01 RX ADMIN — MIDAZOLAM HYDROCHLORIDE 2 MG: 1 INJECTION, SOLUTION INTRAMUSCULAR; INTRAVENOUS at 07:15

## 2020-01-01 RX ADMIN — CHLORHEXIDINE GLUCONATE 15 ML: 1.2 RINSE ORAL at 21:00

## 2020-01-01 RX ADMIN — Medication 2 MG/HR: at 07:25

## 2020-01-01 RX ADMIN — CHLORHEXIDINE GLUCONATE 15 ML: 1.2 RINSE ORAL at 12:00

## 2020-01-01 RX ADMIN — DOPAMINE HYDROCHLORIDE IN DEXTROSE 2.5 MCG/KG/MIN: 1.6 INJECTION, SOLUTION INTRAVENOUS at 04:23

## 2020-01-01 RX ADMIN — EPINEPHRINE 10 MCG/MIN: 1 INJECTION INTRAMUSCULAR; INTRAVENOUS; SUBCUTANEOUS at 07:55

## 2020-01-01 RX ADMIN — PANTOPRAZOLE SODIUM 40 MG: 40 INJECTION, POWDER, FOR SOLUTION INTRAVENOUS at 08:37

## 2020-01-01 RX ADMIN — SODIUM CHLORIDE 5 ML: 9 INJECTION, SOLUTION INTRAMUSCULAR; INTRAVENOUS; SUBCUTANEOUS at 12:52

## 2020-01-01 RX ADMIN — Medication 10 ML: at 08:38

## 2020-01-01 RX ADMIN — Medication: at 13:50

## 2020-01-01 RX ADMIN — PIPERACILLIN AND TAZOBACTAM 3.38 G: 3; .375 INJECTION, POWDER, LYOPHILIZED, FOR SOLUTION INTRAVENOUS at 04:33

## 2020-01-01 RX ADMIN — LORAZEPAM 0.5 MG: 2 INJECTION INTRAMUSCULAR; INTRAVENOUS at 13:38

## 2020-01-01 RX ADMIN — PHENYLEPHRINE HYDROCHLORIDE 300 MCG/MIN: 10 INJECTION INTRAVENOUS at 04:00

## 2020-01-01 RX ADMIN — VASOPRESSIN 0.04 UNITS/MIN: 20 INJECTION INTRAVENOUS at 23:40

## 2020-01-01 RX ADMIN — Medication 10 ML: at 12:53

## 2020-01-01 RX ADMIN — PANTOPRAZOLE SODIUM 40 MG: 40 INJECTION, POWDER, FOR SOLUTION INTRAVENOUS at 22:02

## 2020-01-01 RX ADMIN — SODIUM CHLORIDE 1000 ML: 9 INJECTION, SOLUTION INTRAVENOUS at 03:27

## 2020-01-01 RX ADMIN — VASOPRESSIN 0.04 UNITS/MIN: 20 INJECTION INTRAVENOUS at 15:50

## 2020-01-01 RX ADMIN — SODIUM CHLORIDE 250 ML: 9 INJECTION, SOLUTION INTRAVENOUS at 11:45

## 2020-01-01 RX ADMIN — BACITRACIN: 500 OINTMENT TOPICAL at 18:08

## 2020-01-01 RX ADMIN — Medication: at 06:37

## 2020-01-01 RX ADMIN — PHENYLEPHRINE HYDROCHLORIDE 300 MCG/MIN: 10 INJECTION INTRAVENOUS at 19:23

## 2020-01-01 RX ADMIN — BACITRACIN: 500 OINTMENT TOPICAL at 08:37

## 2020-01-01 RX ADMIN — LEVOTHYROXINE SODIUM ANHYDROUS 75 MCG: 100 INJECTION, POWDER, LYOPHILIZED, FOR SOLUTION INTRAVENOUS at 12:50

## 2020-01-01 RX ADMIN — SODIUM BICARBONATE 50 MEQ: 84 INJECTION, SOLUTION INTRAVENOUS at 17:40

## 2020-01-01 RX ADMIN — Medication 50 MCG/MIN: at 10:11

## 2020-01-01 RX ADMIN — HYDROCORTISONE SODIUM SUCCINATE 100 MG: 100 INJECTION, POWDER, FOR SOLUTION INTRAMUSCULAR; INTRAVENOUS at 18:07

## 2020-01-01 RX ADMIN — HYDROCORTISONE SODIUM SUCCINATE 100 MG: 100 INJECTION, POWDER, FOR SOLUTION INTRAMUSCULAR; INTRAVENOUS at 08:11

## 2020-01-01 RX ADMIN — Medication 50 MCG/MIN: at 23:11

## 2020-01-01 RX ADMIN — DEXTROSE MONOHYDRATE 2 MCG/MIN: 50 INJECTION, SOLUTION INTRAVENOUS at 03:40

## 2020-01-01 RX ADMIN — HYDROCORTISONE SODIUM SUCCINATE 100 MG: 100 INJECTION, POWDER, FOR SOLUTION INTRAMUSCULAR; INTRAVENOUS at 08:37

## 2020-01-01 RX ADMIN — PHENYLEPHRINE HYDROCHLORIDE 300 MCG/MIN: 10 INJECTION INTRAVENOUS at 10:02

## 2020-01-01 RX ADMIN — GLYCOPYRROLATE 0.2 MG: 0.2 INJECTION INTRAMUSCULAR; INTRAVENOUS at 13:38

## 2020-01-01 RX ADMIN — SODIUM CHLORIDE 1000 ML: 9 INJECTION, SOLUTION INTRAVENOUS at 08:49

## 2020-01-01 RX ADMIN — PHYTONADIONE 10 MG: 10 INJECTION, EMULSION INTRAMUSCULAR; INTRAVENOUS; SUBCUTANEOUS at 13:39

## 2020-01-01 RX ADMIN — Medication 10 MCG/MIN: at 06:57

## 2020-01-01 RX ADMIN — PANTOPRAZOLE SODIUM 40 MG: 40 INJECTION, POWDER, FOR SOLUTION INTRAVENOUS at 12:51

## 2020-01-01 RX ADMIN — PHENYLEPHRINE HYDROCHLORIDE 300 MCG/MIN: 10 INJECTION INTRAVENOUS at 22:24

## 2020-01-01 RX ADMIN — EPINEPHRINE 10 MCG/MIN: 1 INJECTION INTRAMUSCULAR; INTRAVENOUS; SUBCUTANEOUS at 06:38

## 2020-01-01 RX ADMIN — Medication 10 ML: at 22:02

## 2020-01-01 RX ADMIN — SODIUM CHLORIDE 5 ML: 9 INJECTION, SOLUTION INTRAMUSCULAR; INTRAVENOUS; SUBCUTANEOUS at 06:49

## 2020-01-01 RX ADMIN — EPINEPHRINE 2 MCG/MIN: 1 INJECTION INTRAMUSCULAR; INTRAVENOUS; SUBCUTANEOUS at 19:55

## 2020-01-01 RX ADMIN — CEFEPIME HYDROCHLORIDE 1 G: 1 INJECTION, POWDER, FOR SOLUTION INTRAMUSCULAR; INTRAVENOUS at 14:01

## 2020-01-01 RX ADMIN — Medication 10 ML: at 21:00

## 2020-01-01 RX ADMIN — SODIUM CHLORIDE 1000 ML: 9 INJECTION, SOLUTION INTRAVENOUS at 04:35

## 2020-01-01 RX ADMIN — MIDAZOLAM HYDROCHLORIDE 2 MG: 1 INJECTION INTRAMUSCULAR; INTRAVENOUS at 07:15

## 2020-01-01 RX ADMIN — SODIUM BICARBONATE 50 MEQ: 84 INJECTION, SOLUTION INTRAVENOUS at 12:25

## 2020-01-01 RX ADMIN — Medication 50 MCG/MIN: at 04:40

## 2020-01-01 RX ADMIN — Medication 1500 MG: at 09:27

## 2020-01-01 RX ADMIN — Medication 30 MCG/MIN: at 18:31

## 2020-01-01 RX ADMIN — Medication 10 ML: at 08:37

## 2020-01-01 RX ADMIN — ALBUMIN (HUMAN) 25 G: 0.25 INJECTION, SOLUTION INTRAVENOUS at 14:59

## 2020-01-01 ASSESSMENT — PULMONARY FUNCTION TESTS
PIF_VALUE: 25
PIF_VALUE: 27
PIF_VALUE: 34
PIF_VALUE: 33
PIF_VALUE: 37
PIF_VALUE: 34
PIF_VALUE: 33

## 2020-01-01 ASSESSMENT — PAIN SCALES - GENERAL
PAINLEVEL_OUTOF10: 0
PAINLEVEL_OUTOF10: 10

## 2020-01-01 ASSESSMENT — PAIN DESCRIPTION - PAIN TYPE: TYPE: ACUTE PAIN

## 2020-01-01 ASSESSMENT — PAIN DESCRIPTION - DESCRIPTORS: DESCRIPTORS: PATIENT UNABLE TO DESCRIBE

## 2020-01-01 ASSESSMENT — PAIN DESCRIPTION - FREQUENCY: FREQUENCY: CONTINUOUS

## 2020-01-01 ASSESSMENT — PAIN DESCRIPTION - LOCATION: LOCATION: ABDOMEN

## 2020-01-01 ASSESSMENT — PAIN DESCRIPTION - ORIENTATION: ORIENTATION: RIGHT;LEFT;LOWER;MID;UPPER

## 2020-07-24 PROBLEM — A41.9 SEPSIS (HCC): Status: ACTIVE | Noted: 2020-01-01

## 2020-07-24 PROBLEM — R65.21 SEPTIC SHOCK (HCC): Status: ACTIVE | Noted: 2020-01-01

## 2020-07-24 PROBLEM — A41.9 SEPTIC SHOCK (HCC): Status: ACTIVE | Noted: 2020-01-01

## 2020-07-24 NOTE — CONSULTS
10%, history of diabetes, hypertension atrial fibrillation and chronic CHF. Patient was diagnosed with follicular carcinoma of thyroid and treated in [facility] in March 2020. His code was DNR CC but recently patient changed his code to full code. Palliative care consulted for goals of care, CODE STATUS discussion, family support and symptom management. Active Hospital Problems    Diagnosis Date Noted    Septic shock ([facility] [A41.9, R65.21] 07/24/2020       PAST MEDICAL HISTORY      Diagnosis Date    Anemia     Atrial fibrillation ([facility]     CHF (congestive heart failure) (HCC)     Diabetes mellitus ([facility]     Hyperlipidemia     Hypertension     Thyroid disease        PAST SURGICAL HISTORY  No past surgical history on file.     SOCIAL HISTORY  Social History     Tobacco Use    Smoking status: Not on file   Substance Use Topics    Alcohol use: Not on file    Drug use: Not on file       ALLERGIES  No Known Allergies      MEDICATIONS  Current Medications    EPINEPHrine        chlorhexidine  15 mL Mouth/Throat BID    sodium chloride  250 mL Intravenous Once    midodrine  5 mg Oral TID    [START ON 7/25/2020] levothyroxine  62.5 mcg Intravenous Daily    And    [START ON 7/25/2020] sodium chloride (PF)  5 mL Intravenous Daily    sodium chloride  20 mL Intravenous Once    hydrocortisone sodium succinate PF  100 mg Intravenous Q8H    pantoprazole  40 mg Intravenous BID    And    sodium chloride (PF)  10 mL Intravenous BID    sodium chloride flush  10 mL Intravenous 2 times per day    cefepime  1 g Intravenous Q24H    albumin human  25 g Intravenous Once     acetaminophen **OR** acetaminophen, sodium chloride flush  IV Drips/Infusions   norepinephrine 28 mcg/min (07/24/20 1035)    midazolam Stopped (07/24/20 1059)    vasopressin (Septic Shock) infusion 0.04 Units/min (07/24/20 1035)    sodium bicarbonate infusion 75 mL/hr at 07/24/20 1350    phenylephrine (WILMER-SYNEPHRINE) 50mg/250mL infusion 100 mcg/min (07/24/20 1406)     Home Medications  No current facility-administered medications on file prior to encounter.       Current Outpatient Medications on File Prior to Encounter   Medication Sig Dispense Refill    acetaminophen (TYLENOL) 325 MG tablet Take 650 mg by mouth every 6 hours as needed for Pain      vitamin C (ASCORBIC ACID) 500 MG tablet Take 500 mg by mouth daily      aspirin 81 MG chewable tablet Take 81 mg by mouth daily      bumetanide (BUMEX) 1 MG tablet Take 1 mg by mouth 2 times daily      cyanocobalamin 1000 MCG tablet Take 1,000 mcg by mouth daily      insulin glargine (LANTUS) 100 UNIT/ML injection vial Inject 10 Units into the skin nightly      levothyroxine (SYNTHROID) 112 MCG tablet Take 112 mcg by mouth Daily      magnesium oxide (MAG-OX) 400 MG tablet Take 400 mg by mouth daily      melatonin 3 MG TABS tablet Take 3 mg by mouth nightly as needed      metoprolol tartrate (LOPRESSOR) 25 MG tablet Take 25 mg by mouth daily      midodrine (PROAMATINE) 5 MG tablet Take 5 mg by mouth 3 times daily      mirtazapine (REMERON) 15 MG tablet Take 15 mg by mouth nightly      insulin aspart (NOVOLOG) 100 UNIT/ML injection vial Inject 6 Units into the skin 2 times daily Indications: every evening      ondansetron (ZOFRAN) 4 MG tablet Take 4 mg by mouth every 8 hours as needed for Nausea or Vomiting      rivaroxaban (XARELTO) 20 MG TABS tablet Take 20 mg by mouth         Data         BP (!) 74/29   Pulse 120   Temp 92.3 °F (33.5 °C) (Core)   Resp 22   Ht 6' 4\" (1.93 m)   Wt 246 lb 14.6 oz (112 kg)   SpO2 (!) 77%   BMI 30.06 kg/m²     Wt Readings from Last 3 Encounters:   07/24/20 246 lb 14.6 oz (112 kg)   07/24/20 244 lb 11.4 oz (111 kg)        Code Status: Full Code     ADVANCED CARE PLANNING:  Patient has capacity for medical decisions: no  Health Care Power of : no  Living Will: no     Personal, Social, and Family History  Marital Status:   Living situation:nursing home  Does patient understand diagnosis/treatment? no  Does caregiver understand diagnosis/treatment? yes      Assessment        REVIEW OF SYSTEMS    ROS unable to be done, patient intubated    PHYSICAL ASSESSMENT:  Constitutional: Intubated, sedated, ill-appearing  Head: Normocephalic and atraumatic. Eyes: EOM are normal. Pupils are equal, round   Neck: Normal range of motion. Neck supple. No tracheal deviation present. Cardiovascular: Normal rate and regular rhythm, S1, S2, no murmur   Pulmonary/Chest: On mechanical ventilator, no rales or wheezes. Abdomen: Soft. No tenderness, not distended, no ascites, no organomegaly   Musculoskeletal: ++ +edema lower ext. Neurological: Intubated, sedated, not following commands  Skin: Normal turgor, no bleeding, no bruising     Palliative Performance Scale:  ___60%  Ambulation reduced; Significant disease; Can't do hobbies/housework; intake normal or reduced; occasional assist; LOC full/confusion  ___50%  Mainly sit/lie; Extensive disease; Can't do any work; Considerable assist; intake normal or reduced; LOC full/confusion  ___40%  Mainly in bed; Extensive disease; Mainly assist; intake normal or reduced; LOC full/confusion   ___30%  Bed Bound; Extensive disease; Total care; intake reduced; LOCfull/confusion  __x_20%  Bed Bound; Extensive disease; Total care; intake minimal; Drowsy/coma  ___10%  Bed Bound; Extensive disease;  Total care; Mouth care only; Drowsy/coma  ___0       Death      Plan      Palliative Interaction:  Patient was seen today, remains intubated, sedated, not following commands, ill-appearing    No family member was present in patient's room    I discussed patient's current medical conditions with critical care resident Dr. Sammi Landau      I along with Dr. Sammi Landau called patient's daughter Leonard Loaiza at- [phone] and she told that she will contact her sister Sandy Bhakta so that both could be on conference call with us    Rain told that patient was  and has only 2 living daughters her and her sister Jamison Ashley    I explained the significance of POA paperwork for health to both Rain and Swift Dion and told them that since patient has them as is only children thus both will be equally responsible in making healthcare decisions for the patient and he stated that they understand    We discussed patient's current medical conditions with both Rain and Swiftjennifer Ashley and explained them that their father-patient was in very critical condition requiring multiple pressors to support his blood pressure    I discussed with Rain and Jamison Ashley that patient had cardiac arrest in the ER and resuscitative measures/CPR were performed and if patient again cardiac arrests and requires CPR/chest compressions then it can cause patient lot of injury and harm instead of benefit    Both Rain and Jamison Ashley told that they would not want the patient to have CPR/chest compressions to be done if his heart stopped but wanted him to remain intubated and all treatments to continue    I explained the different types of codes to both [facility] [place] and they both agreed to changing patient's CODE STATUS to DNR CCA with intubation and all treatments to continue and this was witnessed by both me and Seng Kay critical care resident      We changed patient's CODE STATUS to DNR CCA with intubation and all treatments to continue as per family wishes    We offered comfort and emotional support to the family      Education/support to staff  Education/support to family  Education/support to patient  Discharge planning/helping to coordinate care  Communications with primary service  Caregiver support/education  Code status clarified: Full Code  Code status clarified: [facility]  Code status clarified: DNRCCA  Other major issues     Principle Problem/Diagnosis:  Septic shock    Additional Assessments:   Active Problems:    Septic shock ([address]    1- Symptom management/ pain control     Pain

## 2020-07-24 NOTE — ED NOTES
PT POSTICTAL ENDING ALL HE IS DOING IS CRYING AND MOANING IN PAIN    DR. Tierra Mora STILL [facility]  07/24/20 8429

## 2020-07-24 NOTE — ED NOTES
PT CRIED AND YELLING ARMS DECOR.  AND THEN SEIZED FOR APPROX 1 MIN     Natalie Ramos RN  07/24/20 0678

## 2020-07-24 NOTE — FLOWSHEET NOTE
Assessment: Patient's condition was critical. Patient was intubated and unresponsive. Family was present and open to spiritual care. Per family, patient was raised [place] but not affiliated with any Nondenominational. Intervention:  took family back to patient's room and prayed at patient's bedside.  maintained presence, offered support and reassured family that patient was in good hands. Per nurse, patient would be transferred to ICU. Outcome: Family was very appreciative of the spiritual support they received. Chaplains would continue to visit for on going assessment of patient's condition and for more spiritual and emotional support. 07/24/20 0954   Encounter Summary   Services provided to: Patient and family together   Support System Family members   Place of Scientologist None   Continue Visiting   (07/24/2020)   Complexity of Encounter High   Length of Encounter 1 hour;30 minutes   Spiritual Assessment Completed Yes   Routine   Type Initial   Assessment Calm; Approachable   Intervention Active listening;Prayer;[place]   Outcome Expressed gratitude   Spiritual/Anabaptism   Type Spiritual support

## 2020-07-24 NOTE — ED NOTES
2 more failed attempts getting in [address],[facility], [address]  07/24/20 2862

## 2020-07-24 NOTE — ED NOTES
Critical troponin of 254 received from lab. Resident notified.       Mary Thompson RN  07/24/20 6654

## 2020-07-24 NOTE — PROGRESS NOTES
Nephrology Consult Note    Reason for Consult: Elevated BUN and creatinine  Requesting Physician: Acute care medicine    Chief Complaint: Brought in with sepsis and hypotension  History Obtained From: Chart review and ICU resident    History of Present Illness: This is a 78 y.o. male who has complicated past medical history. He has cardiac amyloidosis for that he is under the care of [facility]. His EF is close to 10%. Patient also has a history of longstanding diabetes, hypertension, atrial fibrillation and chronic congestive cardiac failure. Patient also runs low blood pressure and he was on ProAmatine at home. Patient also has a history of follicular carcinoma of thyroid (treated at [facility] in March 2020. His cardiac status used to be DNR CC however recently he is changed his cardiac status to full code this information is again from ICU resident who talk to his daughters. Patient was at nursing home where it was noted he has changes in mental status and his running a low blood pressure. He was transferred to [facility] ER. In ER patient found to have severe hypotension, he was started on broad-spectrum antibiotic with the diagnosis of sepsis. He also had distended abdomen. Due to acuity of the situation patient was transferred to [place].  While in ER patient coded and Cordran for 10 to 15 minutes. A CT scan of the abdomen shows pleural effusion but no evidence of bowel ischemia. His lactic acid was 16 and pH was 7.0. His baseline creatinine is 0.8 however in March 0.8 mg/dL however on June 19 his creatinine was 2.0 mg/dL. When he presented to [place] his was 2.4 and now most recent creatinine is 2.8 mg/dL. Patient is anuric.   Patient did not receive any CT with contrast.  At present he is on 3 pressors and is still blood pressure is in 80s  Patient is on 100% FiO2    Past Medical History:        Diagnosis Date    Anemia     Atrial fibrillation ([facility]     CHF (congestive heart failure) ([facility]     [facility]     Hyperlipidemia     Hypertension     Thyroid disease        Past Surgical History:    No past surgical history on file. Current Medications:    EPINEPHrine 1 MG/10ML injection,   norepinephrine (LEVOPHED) 16 mg in sodium chloride 0.9 % 250 mL infusion, Continuous  midazolam (VERSED) 1 mg/mL in D5W infusion, Continuous  vasopressin 20 Units in dextrose 5 % 100 mL infusion, Continuous  chlorhexidine (PERIDEX) 0.12 % solution 15 mL, BID  0.9 % sodium chloride bolus, Once  midodrine (PROAMATINE) tablet 5 mg, TID  [START ON 7/25/2020] levothyroxine (SYNTHROID) injection 62.5 mcg, Daily    And  [START ON 7/25/2020] sodium chloride (PF) 0.9 % injection 5 mL, Daily  0.9 % sodium chloride bolus, Once  hydrocortisone sodium succinate PF (SOLU-CORTEF) injection 100 mg, Q8H  phytonadione (ADULT) (VITAMIN K) 10 mg in dextrose 5 % 100 mL IVPB, Once  pantoprazole (PROTONIX) injection 40 mg, BID    And  sodium chloride (PF) 0.9 % injection 10 mL, BID  sodium bicarbonate 150 mEq in dextrose 5 % 1,000 mL infusion, Continuous  acetaminophen (TYLENOL) tablet 650 mg, Q6H PRN    Or  acetaminophen (TYLENOL) suppository 650 mg, Q6H PRN  sodium chloride flush 0.9 % injection 10 mL, 2 times per day  sodium chloride flush 0.9 % injection 10 mL, PRN  cefepime (MAXIPIME) 1 g IVPB minibag, Q24H  phenylephrine (WILMER-SYNEPHRINE) 50 mg in dextrose 5 % 250 mL infusion, Continuous  albumin human 25 % IV solution 25 g, Once        Allergies:  Patient has no known allergies. Social History:   Social History     Socioeconomic History    Marital status:       Spouse name: Not on file    Number of children: Not on file    Years of education: Not on file    Highest education level: Not on file   Occupational History    Not on file   Social Needs    Financial resource strain: Not on file    Food insecurity     Worry: Not on file     Inability: carotid bruit, scar of recent surgery was present  Pulmonary: Has bilateral air entry   cardiovascular: 1 and S2 audible no S3  Abdomen: soft nontender, bowel sounds present, no organomegaly,  no ascites  Extremities: 3+ edema up to upper thigh and lateral abdominal wall  Labs:   CBC:  Recent Labs     07/22/20  0746 07/24/20  0330 07/24/20  0728 07/24/20  1021   WBC 3.3* 2.8* 4.7  --    RBC 3.92* 4.14* 3.68*  --    HGB 11.2* 12.0* 10.9* 10.9*   HCT 36.6* 40.9* 38.3* 37.4*   MCV 93.4 98.9 104.1*  --    MCH 28.6 29.0 29.6  --    MCHC 30.6 29.3* 28.5  --    RDW 21.3* 22.1* 21.8*  --     154 150  --    MPV 11.4 9.2 11.6  --       BMP:   Recent Labs     07/22/20  0746  07/24/20 0330 07/24/20  0728 07/24/20  0740 07/24/20  0822 07/24/20  0902 07/24/20  1158   *  --  125* 127*  --   --   --   --    K 4.6  --  5.2 4.8  --   --   --   --    CL 87*  --  85* 86*  --   --   --   --    CO2 16*  --  8* 7*  --   --   --   --    BUN 57*  --  63* 58*  --   --   --   --    CREATININE 2.44*  --  3.00* 2.84* 3.19*  --   --  2.93*   GLUCOSE 111*   < > 110* 208*  --  168 150  --    CALCIUM 9.4  --  9.8 8.6  --   --   --   --     < > = values in this interval not displayed. Albumin:   Recent Labs     07/22/20  0746 07/24/20 0330   LABALBU 3.4* 3.6     SPEP:   Lab Results   Component Value Date    PROT 6.8 07/24/2020   Assessment:  In summary 71-year-old gentleman who had a complicated past medical history and significant for cardiomyopathy with EF of 10% presented with septic shock, lactic acidosis with pH of 7.0, acute renal failure as well as hyponatremia. His current nephrological and associated diagnoses include:  1. Anuric ATN most likely due to ischemia and hypoperfusion state. His creatinine is significantly elevated from his baseline  2. Cardiomyopathy with EF of 10% due to cardiac amyloidosis  3. Severe lactic acidosis with distended abdomen concern ischemic gut  4.   Hyponatremia due to congestive cardiac failure and dilutional  5. Septic shock patient on broad-spectrum antibiotic and 3 pressors so far  6. Anasarca due to advanced cardiac failure  7. Anemia of chronic disease  8. Papillary carcinoma status post resection  9. Status post cardiac arrest     plan:  1. Will Check Renal Ultrasound to r/o element of obstruction and to assess the kidney size/echotexture. 2.  Agree to start bicarb drip  3. Give 25 g of albumin  4. Check BMP this evening  5. May need CVVHD for fluid removal.  At this point patient is too unstable to start any intervention  6. Discussed with resident and will follow with you      Thank you for the consultation. Please do not hesitate to call with questions.     Electronically signed by Randi Noel MD on 7/24/2020 at 2:21 PM

## 2020-07-24 NOTE — ED PROVIDER NOTES
[facility] HANDOFF       Handoff taken on the following patient from prior Attending Physician:  Pt Name: Juliet Fuentes  PCP:  No primary care provider on file. Attestation  I was available and discussed any additional care issues that arose and coordinated the management plans with the resident(s) caring for the patient during my duty period. Any areas of disagreement with resident's documentation of care or procedures are noted on the chart. I was personally present for the key portions of any/all procedures during my duty period. I have documented in the chart those procedures where I was not present during the key portions.          CHIEF COMPLAINT       Chief Complaint   Patient presents with    Altered Mental Status         CURRENT MEDICATIONS     Previous Medications  Previous Medications    ACETAMINOPHEN (TYLENOL) 325 MG TABLET    Take 650 mg by mouth every 6 hours as needed for Pain    ASPIRIN 81 MG CHEWABLE TABLET    Take 81 mg by mouth daily    BUMETANIDE (BUMEX) 1 MG TABLET    Take 1 mg by mouth 2 times daily    CYANOCOBALAMIN 1000 MCG TABLET    Take 1,000 mcg by mouth daily    INSULIN ASPART (NOVOLOG) 100 UNIT/ML INJECTION VIAL    Inject 6 Units into the skin 2 times daily Indications: every evening    INSULIN GLARGINE (LANTUS) 100 UNIT/ML INJECTION VIAL    Inject 10 Units into the skin nightly    LEVOTHYROXINE (SYNTHROID) 112 MCG TABLET    Take 112 mcg by mouth Daily    MAGNESIUM OXIDE (MAG-OX) 400 MG TABLET    Take 400 mg by mouth daily    MELATONIN 3 MG TABS TABLET    Take 3 mg by mouth nightly as needed    METOPROLOL TARTRATE (LOPRESSOR) 25 MG TABLET    Take 25 mg by mouth daily    MIDODRINE (PROAMATINE) 5 MG TABLET    Take 5 mg by mouth 3 times daily    MIRTAZAPINE (REMERON) 15 MG TABLET    Take 15 mg by mouth nightly    ONDANSETRON (ZOFRAN) 4 MG TABLET    Take 4 mg by mouth every 8 hours as needed for Nausea or Vomiting    RIVAROXABAN (XARELTO) 20 MG TABS TABLET Take 20 mg by mouth    VITAMIN C (ASCORBIC ACID) 500 MG TABLET    Take 500 mg by mouth daily       Encounter Medications  Orders Placed This Encounter   Medications    EPINEPHrine 1 MG/10ML injection     Sarah Mccollum: cabinet override    midazolam (VERSED) 2 MG/2ML injection     DOMO BLACKWOOD: cabinet override    MIDAZOLAM 1 MG/ML IN D5W INFUSION     Vijay Rodriguez: cabinet override    norepinephrine (LEVOPHED) 16 mg in sodium chloride 0.9 % 250 mL infusion    midazolam (VERSED) 1 mg/mL in D5W infusion    vasopressin 20 Units in dextrose 5 % 100 mL infusion    vancomycin (VANCOCIN) 15 mg/kg in dextrose 5 % 250 mL IVPB    chlorhexidine (PERIDEX) 0.12 % solution 15 mL    famotidine (PEPCID) injection 20 mg       ALLERGIES     has no allergies on file.       RECENT VITALS:   Temp: 92.7 °F (33.7 °C),  Pulse: 121,  , BP: (!) 75/56    RADIOLOGY:   XR CHEST PORTABLE    (Results Pending)       LABS:  Labs Reviewed   CBC WITH AUTO DIFFERENTIAL - Abnormal; Notable for the following components:       Result Value    RBC 3.68 (*)     Hemoglobin 10.9 (*)     Hematocrit 38.3 (*)     .1 (*)     RDW 21.8 (*)     NRBC Automated 4.9 (*)     All other components within normal limits   HGB/HCT - Abnormal; Notable for the following components:    POC Hemoglobin 13.1 (*)     POC Hematocrit 39 (*)     All other components within normal limits   SODIUM (POC) - Abnormal; Notable for the following components:    POC Sodium 128 (*)     All other components within normal limits   CALCIUM, IONIC (POC) - Abnormal; Notable for the following components:    POC Ionized Calcium 1.05 (*)     All other components within normal limits   POC GLUCOSE FINGERSTICK - Abnormal; Notable for the following components:    POC Glucose 73 (*)     All other components within normal limits   VENOUS BLOOD GAS, POINT OF CARE - Abnormal; Notable for the following components:    pH, [place] 6.853 (*)     pCO2, [place] 52.6 (*)     pO2, [place] 58.3 (*)     HCO3, Venous 9.2 (*)     Total CO2, Venous 11 (*)     Negative Base Excess, [place] 24 (*)     All other components within normal limits   CREATININE W/GFR POINT OF CARE - Abnormal; Notable for the following components:    POC Creatinine 3.19 (*)     GFR Comment 24 (*)     GFR Non- 19 (*)     All other components within normal limits   LACTIC ACID,POINT OF CARE - Abnormal; Notable for the following components:    POC Lactic Acid 17.46 (*)     All other components within normal limits   POCT GLUCOSE - Abnormal; Notable for the following components:    POC Glucose 197 (*)     All other components within normal limits   ANION GAP (CALC) POC - Abnormal; Notable for the following components:    Anion Gap 21 (*)     All other components within normal limits   POTASSIUM (POC)   CHLORIDE (POC)   BASIC METABOLIC PANEL W/ REFLEX TO MG FOR LOW K   LACTIC ACID, PLASMA   BLOOD GAS, ARTERIAL   COVID-19   POC GLUCOSE FINGERSTICK           PLAN/ TASKS OUTSTANDING     Patient coded shortly after arrival received CPR for about 10 minutes and was intubated. Patient is acutely ill. Will rapid swab for coven, patient on multiple pressors, ICU admission      (Please note that portions of this note were completed with a voice recognition program.  Efforts were made to edit the dictations but occasionally words are mis-transcribed. )    Hussein MD, F.A.C.E.P.   Attending Emergency Physician      Cami Lantigua MD  07/24/20 4790

## 2020-07-24 NOTE — ED PROVIDER NOTES
[facility]     Emergency Department     Faculty Attestation    I performed a history and physical examination of the patient and discussed management with the resident. I have reviewed and agree with the residents findings including all diagnostic interpretations, and treatment plans as written. Any areas of disagreement are noted on the chart. I was personally present for the key portions of any procedures. I have documented in the chart those procedures where I was not present during the key portions. I have reviewed the emergency nurses triage note. I agree with the chief complaint, past medical history, past surgical history, allergies, medications, social and family history as documented unless otherwise noted below. Documentation of the HPI, Physical Exam and Medical Decision Making performed by valerieibsherry is based on my personal performance of the HPI, PE and MDM. For Physician Assistant/ Nurse Practitioner cases/documentation I have personally evaluated this patient and have completed at least one if not all key elements of the E/M (history, physical exam, and MDM). Additional findings are as noted. 79 yo M transfer from [place], septic, levophed, zosyn, central line,  Per [place],   pe lethargic, hypoxic, hypotensive,   Distended tense abdomen, 3 + pitting edema bilateral lower extremity     Bedside sign out performed with Dr Bekah Ortiz  Impending intubation, O2 applied,   Care turned over      EKG Interpretation    Interpreted by me      CRITICAL CARE: There was a high probability of clinically significant/life threatening deterioration in this patient's condition which required my urgent intervention. Total critical care time was 10 minutes. This excludes any time for separately reportable procedures.        [place] [address] [address] [place] [address]  07/24/20 1352

## 2020-07-24 NOTE — ED NOTES
Daughter stated that the ulcer is infected with MRSA and been getting antibiotics for weeks     Deborra NATALIE Tong  07/24/20 7021

## 2020-07-24 NOTE — ED NOTES
Family at bedside with , patient daughter. Updated on plan of care. Daughter denies needs.       Leonel Lopez RN  07/24/20 7556

## 2020-07-24 NOTE — CODE DOCUMENTATION
Writer and Dr. Dino Boast spoke to patient's daughters Rain and Kathy [place] over phone. Called Rain at [phone] and she connected [facility] ([place] on conference call. Both daughters state that they both are POA's for the patient and they have the paperwork. We had an extensive discussion regarding patient's medical condition with both the daughters. Patient's medical conditions include acute respiratory failure, cardiac arrest, severe heart failure, cardiogenic shock, severe metabolic acidosis, multiorgan failure. All their questions were answered appropriately. They were also explained by Dr. Dino Boast about different CODE STATUS's in detail. Both the daughters together opined that they father would not like to have any extreme measures if he arrests. CODE STATUS was changed to DNR CCA with intubation. Documentation filled and signed by myself and Dr. Dino Boast. Document attached to patient's chart and orders placed for DNR CCA. Sanjana Dobson MD  Internal Medicine Resident, PGY-2  [address]  7/24/2020, 4:46 PM

## 2020-07-24 NOTE — ED NOTES
Pt to ed via mobile life, transfer from [place] ED. Patient was resident at [facility] prior to ED arrival. Patient arrived with decreased mentation. Slightly responding to pain. Patient at baseline is able to carry out a conversation with family. Patient has hx of thyroidectomy. 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 Patient arrived to ed with Dr. Venkat Harkins, ed team and Dr. Key Childs. Shey Jain at bedside to prep for intubation. Patient daughter in waiting room. Patient has been at [facility] and bed bound since thyroidectomy.      Juan Luis Perez RN  07/24/20 0900

## 2020-07-24 NOTE — ED NOTES
Lab called to verify d-dimer, fibrinogen and trop can be run. Reports they are in proccess.       Mathew Vazquez RN  07/24/20 [id number]

## 2020-07-24 NOTE — H&P
Critical Care - History and Physical Examination    Patient's name:  Jenny Pires  Medical Record Number: [id number]  Patient's account/billing number: [de-identified]  Patient's YOB: 1950  Age: 78 y.o. Date of Admission: 7/24/2020  6:55 AM  Reason of ICU admission:   Date of History and Physical Examination: 7/24/2020      Primary Care Physician: No primary care provider on file. Attending Physician: Dr. Karena Blackwell Status: Full Code    Chief complaint: Altered mental status    Reason for ICU admission: Cardiac arrest, acute respiratory failure, shock      History Of Present Illness:   History was obtained from chart review and unobtainable from patient due to mental status. Jenny Pires is a 78 y.o. 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 fib on Xarelto, DM type II, HTN, s/p thyroidectomy on Synthroid initially presented to [place] ED via EMS from nursing home for lethargy, hypoglycemia, hypoxia. Per report, his sugars were in 40s, improved with dextrose. At [place] ED, patient became hypotensive requiring pressor support with Levophed and dopamine. Lactic acid was 19. Was given 1 dose of Zosyn and transferred to [facility]'s ED. In [facility] ED, patient was unresponsive. Intubated. Patient was hypothermic. Put on bear hugger. Patient had PEA arrest and CPR performed for 10 minutes. Epinephrine x2, bicarb x1, D50 x1 were given. ROSC attained in 10 minutes. Patient was started on Levophed and vasopressin. Labs revealed, lactic acid of 18. Creatinine of 3.19, bicarb 7. VBG showing pH 6.8, PCO2 52.6, PO2 58.3, bicarb 9.2,  LFTs normal.  Platelets normal.    X-ray chest demonstrated multifocal groundglass opacities bilaterally. CT PE could not be done because of patient's unstable condition. Rapid COVID 19 was negative. PTT, PT, INR were elevated. Fibrinogen low. Concern for DIC picture.   proBNP elevated at 30,000, troponins 254 trended down to 240. TSH was 29, total T4 4.4. In MICU, patient is unresponsive. Intubated and sedated on Versed. Maxed on vasopressin, Levophed at 28. ABG showed pH of 7.024, bicarb 8.5, PCO2 32.6, PO2 58.8. PRVC mode-FiO2 100%, PEEP 10. Generalized anasarca present. Bilateral upper and lower extremities edematous. Abdomen tense. Past Medical History:        Diagnosis Date    Anemia     Atrial fibrillation (HCC)     CHF (congestive heart failure) (HCC)     Diabetes mellitus ([facility] [address]     Hyperlipidemia     Hypertension     Thyroid disease        Past Surgical History:  No past surgical history on file. Allergies:    No Known Allergies      Home Medications:   Prior to Admission medications    Medication Sig Start Date End Date Taking?  Authorizing Provider   acetaminophen (TYLENOL) 325 MG tablet Take 650 mg by mouth every 6 hours as needed for Pain    Historical Provider, MD   vitamin C (ASCORBIC ACID) 500 MG tablet Take 500 mg by mouth daily    Historical Provider, MD   aspirin 81 MG chewable tablet Take 81 mg by mouth daily    Historical Provider, MD   bumetanide (BUMEX) 1 MG tablet Take 1 mg by mouth 2 times daily    Historical Provider, MD   cyanocobalamin 1000 MCG tablet Take 1,000 mcg by mouth daily    Historical Provider, MD   insulin glargine (LANTUS) 100 UNIT/ML injection vial Inject 10 Units into the skin nightly    Historical Provider, MD   levothyroxine (SYNTHROID) 112 MCG tablet Take 112 mcg by mouth Daily    Historical Provider, MD   magnesium oxide (MAG-OX) 400 MG tablet Take 400 mg by mouth daily    Historical Provider, MD   melatonin 3 MG TABS tablet Take 3 mg by mouth nightly as needed    Historical Provider, MD   metoprolol tartrate (LOPRESSOR) 25 MG tablet Take 25 mg by mouth daily    Historical Provider, MD   midodrine (PROAMATINE) 5 MG tablet Take 5 mg by mouth 3 times daily    Historical Provider, MD   mirtazapine (REMERON) 15 MG tablet Take 15 mg by mouth nightly Historical Provider, MD   insulin aspart (NOVOLOG) 100 UNIT/ML injection vial Inject 6 Units into the skin 2 times daily Indications: every evening    Historical Provider, MD   ondansetron (ZOFRAN) 4 MG tablet Take 4 mg by mouth every 8 hours as needed for Nausea or Vomiting    Historical Provider, MD   rivaroxaban (XARELTO) 20 MG TABS tablet Take 20 mg by mouth    Historical Provider, MD       Social History:   TOBACCO:   has no history on file for tobacco.  ETOH:   has no history on file for alcohol. DRUGS:  has no history on file for drug. OCCUPATION:      Family History:   No family history on file. REVIEW OF SYSTEMS (ROS):  ROS could not be obtained due to patient's mental status. Physical Exam:    Vitals: BP (!) 74/29   Pulse 120   Temp 92.3 °F (33.5 °C) (Core)   Resp 22   Ht 6' 4\" (1.93 m)   Wt 246 lb 14.6 oz (112 kg)   SpO2 (!) 77%   BMI 30.06 kg/m²     Body weight:   Wt Readings from Last 3 Encounters:   07/24/20 246 lb 14.6 oz (112 kg)   07/24/20 244 lb 11.4 oz (111 kg)       Body Mass Index : Body mass index is 30.06 kg/m². PHYSICAL EXAMINATION :  Constitutional: Patient comatose, unresponsive. Intubated and sedated. EENT: Pupils reactive to light. Neck: Supple, symmetrical, trachea midline, no adenopathy, thyroid symmetric, no jvd skin normal  Respiratory: clear to auscultation, no wheezes or rales and unlabored breathing. No intercostal tenderness  Cardiovascular: Visible thrill at the tricuspid area. On auscultation, holosystolic murmur and tricuspid area. S1-S2 normal.  Abdomen: Distended, tense. Ascites present. Organomegaly could not be assessed.   Extremities:  peripheral pulses normal, no pedal edema, no clubbing or cyanosis      Laboratory findings:-    CBC:   Recent Labs     07/24/20  0728 07/24/20  1021   WBC 4.7  --    HGB 10.9* 10.9*     --      BMP:    Recent Labs     07/24/20  0728 07/24/20  0740  07/24/20  0902 07/24/20  1158   *  --   -- --   --    K 4.8  --   --   --   --    CL 86*  --   --   --   --    CO2 7*  --   --   --   --    BUN 58*  --   --   --   --    CREATININE 2.84* 3.19*  --   --  2.93*   GLUCOSE 208*  --    < > 150  --     < > = values in this interval not displayed. S. Calcium:  Recent Labs     07/24/20 0728   CALCIUM 8.6     S. Ionized Calcium:No results for input(s): IONCA in the last 72 hours. S. Magnesium:No results for input(s): MG in the last 72 hours. S. Phosphorus:No results for input(s): PHOS in the last 72 hours. S. Glucose:  Recent Labs     07/24/20  0917 07/24/20  1114 07/24/20  1158   POCGLU 130* 140* 141*     Glycosylated hemoglobin A1C: No results for input(s): LABA1C in the last 72 hours. INR:   Recent Labs     07/24/20 0728   INR 8.2*     Hepatic functions:   Recent Labs     07/24/20  0330   ALKPHOS 97   ALT 15   AST 52*   PROT 6.8   BILITOT 1.80*   LABALBU 3.6     Pancreatic functions:  Recent Labs     07/24/20 0728   LACTA NOT REPORTED     S. Lactic Acid:   Recent Labs     07/24/20 0728   LACTA NOT REPORTED     Cardiac enzymes:  Recent Labs     07/24/20 0728   CKTOTAL 268     BNP:No results for input(s): BNP in the last 72 hours. Lipid profile: No results for input(s): CHOL, TRIG, HDL, LDLCALC in the last 72 hours.     Invalid input(s): LDL  Blood Gases: No results found for: PH, PCO2, PO2, HCO3, O2SAT  Thyroid functions:   Lab Results   Component Value Date    TSH 29.00 07/24/2020        Urinalysis:     Microbiology:  Cultures during this admission:     Blood cultures:                 [] None drawn      [] Negative             []  Positive (Details:  )  Urine Culture:                   [] None drawn      [] Negative             []  Positive (Details:  )  Sputum Culture:               [] None drawn       [] Negative             []  Positive (Details:  )   Endotracheal aspirate:     [] None drawn       [] Negative             []  Positive (Details:  ) -----------------------------------------------------------------  Radiological reports:    CXR:    Electrocardiogram:     Last Echocardiogram findings:          Assessment and Plan     Patient Active Problem List   Diagnosis    Sepsis ([facility] [address]    Septic shock ([facility]         Assessment and plan:    1. Shock-Likely cardiogenic. ? Septic  · Currently on max vasopressin, 28 mcg/kg/min of levophed. · Start Pete-Synephrine with parameters. · Received total of 3 L IV boluses along with albumin. · Avoid any more fluid boluses due to CHF. · Received 1 dose of each vancomycin and Zosyn. Start cefepime 1 g IV every 24 hours. · Received 1 dose of 100 mg IV Solu-Cortef. Continue Solu-Cortef 100 mg IV every 8 hours. · Dobutamine if needed for pressor support. · Telemetry monitoring. · Follow blood cultures. 2. Acute hypoxic respiratory failure secondary to cardiogenic shock  · Intubated and sedated on Versed. · Saturating 70% on FiO2 100%, PEEP 10. Will increase PEEP to 15. · ABG showing severe metabolic acidosis with pH 7.0, bicarb 8. · 3 Amps of bicarb followed by bicarb drip at 75 mill per hour. 3. 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 · proBNP 30,000. Troponins 254. · Trend troponins. · Consult cardiology. · Stat 2D echo. 4. S/P PEA cardiac arrest  · s/p CPR for 10 minutes. Received epinephrine x2, bicarb x1, D50 x1. · Patient currently full code. 5. Lactic acidosis secondary to shock  · Lactic acid persistently elevated, 20 on last reading. · Bicarb 7. · 3 Amps bicarb IV. Followed by bicarb drip. · Consulted nephrology, recommending CVVHD once the patient stabilizes. · Trend lactic acid. · Follow-up BMP 6 PM.  · BMP daily. 6. GISSELLE due to shock  · Initial creatinine was 3.19.  · Received IV fluid boluses. Creatinine improved to 2.84. · Low urine output.   Nephrology recommending CVVHD once the patient stabilizes. · Nephrology following. 7. Disseminated intravascular coagulation  · PT, INR, PTT elevated. Fibrinogen low. · 2 FFP's ordered. 1 cryoprecipitate ordered. · 1 dose vitamin K 10 mg IV. · Repeat INR, fibrinogen after the FFP's and cryo. · INR daily. 8. Hypoglycemia  · Per report, blood glucose initially was 40s. Improved with dextrose. · Last POC glucose was 140. · Hypoglycemia protocol. 9. H/o Atrial fibrillation with RVR  · Currently in sinus tach. · Was on Xarelto for anticoagulation at home. · Avoid anticoagulation as patient is in DIC. 10. Hypothyroidism secondary to thyroidectomy  · s/p thyroidectomy. On 112 mcg levothyroxine daily at home. · TSH 29, total T4 4.4. · 1 dose of IV 75 mcg levothyroxine followed by IV 62.5 mcg daily from tomorrow. 11. Ulcer prophylaxis: On Protonix IV 40 mg twice daily. 12. DVT prophylaxis: EPC cuffs. No anticoagulation due to DIC. Reesa Buerger, MD  Internal Medicine Resident, PGY-2  [facility]; [facility], [facility]  7/24/2020, 11:00 AM      Attending Physician Statement  I have discussed the care of oRdrick Howard, including pertinent history and exam findings with the resident. I have reviewed the key elements of all parts of the encounter with the resident. I have seen and examined the patient with the resident. I agree with the assessment and plan and status of the problem list as documented.     I have seen the patient after patient was admitted to medical ICU from emergency room, overall events in the emergency room seen he has history of severe nonischemic cardiomyopathy cardiac amyloidosis with ejection fraction of less than 10%, history of chronic kidney disease, nursing home resident and brought into Center emergency room with apparently unresponsiveness increasing shortness of breath and hypoxia and was given dextrose generalized anasarca on exam he was hypotensive initially on presentation required 2 pressors with dopamine and Levophed, GISSELLE, metabolic acidosis with bicarb 7 creatinine of greater than 3, patient was transferred to [place] [address] emergency room he had PEA arrest in the emergency room for 10 minutes and was resuscitated initial labs/venous blood gas shows a pH of 6.8 with bicarb of 6, lactic acid of 18, in the emergency room he was on Levophed and vasopressin, on ventilator 100% FiO2 was saturating in 70s to 80% with a PEEP of 12 and apparently on Versed drip for patient ventilator asynchrony unresponsive when brought into ICU, on examination he has severe and generalized anasarca present of all extremities abdominal and sacral edema. Ventilator setting PRVC/26/16/12 PEEP/100% FiO2. Arterial blood gas was done which showed a pH of 7.02/34/59/with bicarb of 6. He was given antibiotic for presumed septic shock no source is identified. Severe cardiogenic shock. Multisystem organ failure secondary to cardiogenic shock. Acute renal failure. Severe nonischemic cardiomyopathy with ejection fraction of 10%. Generalized anasarca. Hyponatremia secondary to CHF and volume overload. Coagulopathy possibly on Coumadin therapy. Severe lactic acidosis secondary to cardiogenic shock. Severe metabolic acidosis. Coagulopathy. Possible lower lobe infiltrate/atelectasis. Start bicarbonate drip. We will give vitamin K  Transfuse fresh frozen plasma. Albumin. Monitor intake and output and renal function. Start bicarbonate. Start bicarbonate drip. Empiric cefepime therapy. Blood and sputum culture. Nephrology evaluation. Cardiology evaluation and discussed with cardiology. Follow-up troponin CAD. 2D echocardiogram.  We will follow-up BMP. Follow-up serial lactic acid. Repeat INR after vitamin K and  Fresh frozen plasma. Continue with vasopressin Levophed and likely add Pete-Synephrine. Poor candidate for hemodialysis and for CRRT.   Very grim and very poor prognosis and would recommend comfort care and will discuss with family as options are very limited. Discussed with nursing staff, treatment plan discussed with    Total critical care time caring for this patient with life threatening, unstable organ failure, including direct patient contact, management of life support systems, review of data including imaging and labs, discussions with other team members and physicians at least 48  Min so far today, excluding procedures. Please note that this chart was generated using voice recognition Dragon dictation software. Although every effort was made to ensure the accuracy of this automated transcription, some errors in transcription may have occurred.     Polina Carlson MD  7/24/2020 8:11 PM

## 2020-07-24 NOTE — ED PROVIDER NOTES
MIDODRINE (PROAMATINE) 5 MG TABLET    Take 5 mg by mouth 3 times daily    MIRTAZAPINE (REMERON) 15 MG TABLET    Take 15 mg by mouth nightly    ONDANSETRON (ZOFRAN) 4 MG TABLET    Take 4 mg by mouth every 8 hours as needed for Nausea or Vomiting    VITAMIN C (ASCORBIC ACID) 500 MG TABLET    Take 500 mg by mouth daily       ALLERGIES     has no allergies on file. FAMILY HISTORY     has no family status information on file. family history is not on file. SOCIAL HISTORY          PHYSICAL EXAM     INITIAL VITALS:  weight is 111 kg (244 lb 11.4 oz). His core temperature is 92.3 °F (33.5 °C). His blood pressure is 91/79 and his pulse is 115. His respiration is 20.      Gen.: Patient is a elderly male who is moaning. HEENT: Head is atraumatic. Mouth shows moist mucous membranes. Neck: Supple. Respiratory: Lung sounds are clear. Anterior  Cardiac: Heart is tachycardic and irregular without appreciable murmurs. GI: As obese, distended. Bowel sounds are diminished. Peripheral exam patient has pitting edema up into his hips. Neuro: Patient moves all 4 extremities well. Mumbles incoherently    DIFFERENTIAL DIAGNOSIS/ MDM:     , Sepsis, congestive heart failure, electrolyte abnormalities, intracranial lesion, intra-abdominal pathology    DIAGNOSTIC RESULTS     EKG: All EKG's are interpreted by the Emergency Department Physician who either signs or Co-signs this chart in the absence of a cardiologist.    .  EKG interpreted by me, reveals an atrial fib with a rate of 104, no acute ST elevations, incomplete left bundle QRS of 112    RADIOLOGY:   I directly visualized the following  images and reviewed the radiologist interpretations:  CT ABDOMEN PELVIS WO CONTRAST   Final Result   Extensive 3rd spacing with pleural effusions, ascites, anasarca may reflect   right heart failure or multiorgan failure. Consolidative airspace disease in the lower lung zones, could represent   pneumonia as well as atelectasis. Marked cardiomegaly. No specific evidence of ischemic gut within the limits of this non-contrast   examination. Findings were discussed with Gregor Dent at 4:51 am on 7/24/2020. CT HEAD WO CONTRAST   Final Result   No acute intracranial abnormality.          XR CHEST PORTABLE    (Results Pending)         LABS:  Labs Reviewed   CBC WITH AUTO DIFFERENTIAL - Abnormal; Notable for the following components:       Result Value    WBC 2.8 (*)     RBC 4.14 (*)     Hemoglobin 12.0 (*)     Hematocrit 40.9 (*)     MCHC 29.3 (*)     RDW 22.1 (*)     Seg Neutrophils 72 (*)     Lymphocytes 23 (*)     Eosinophils % 0 (*)     nRBC 3 (*)     Absolute Lymph # 0.64 (*)     All other components within normal limits   COMPREHENSIVE METABOLIC PANEL - Abnormal; Notable for the following components:    Glucose 110 (*)     BUN 63 (*)     CREATININE 3.00 (*)     Sodium 125 (*)     Chloride 85 (*)     CO2 8 (*)     Anion Gap 32 (*)     AST 52 (*)     Total Bilirubin 1.80 (*)     GFR Non- 21 (*)     GFR  25 (*)     All other components within normal limits   TROP/MYOGLOBIN - Abnormal; Notable for the following components:    Troponin, High Sensitivity 305 (*)     Myoglobin 575 (*)     All other components within normal limits   LACTIC ACID - Abnormal; Notable for the following components:    Lactic Acid 16.6 (*)     All other components within normal limits   URINE RT REFLEX TO CULTURE - Abnormal; Notable for the following components:    Color, UA DARK YELLOW (*)     Turbidity UA SLIGHTLY CLOUDY (*)     Bilirubin Urine NEGATIVE  Verified by ictotest. (*)     Ketones, Urine TRACE (*)     Urine Hgb LARGE (*)     Protein, UA TRACE (*)     Leukocyte Esterase, Urine MODERATE (*)     All other components within normal limits   BRAIN NATRIURETIC PEPTIDE - Abnormal; Notable for the following components:    Pro-BNP 29,593 (*)     All other components within normal limits   MICROSCOPIC URINALYSIS - Abnormal; Notable for the following components:    Other Observations UA Culture ordered based on defined criteria. (*)     Yeast, UA MODERATE (*)     All other components within normal limits   POCT GLUCOSE - Normal   CULTURE, BLOOD 1   CULTURE, BLOOD 1   CULTURE, URINE   LACTIC ACID   BLOOD GAS, VENOUS         EMERGENCY DEPARTMENT COURSE:   Vitals:    Vitals:    07/24/20 0507 07/24/20 0514 07/24/20 0517 07/24/20 0528   BP: 88/70 88/70 93/62 91/79   Pulse: 108 113 115 115   Resp: 23 9 19 20   Temp: 92.1 °F (33.4 °C) 92.1 °F (33.4 °C) 92.1 °F (33.4 °C) 92.3 °F (33.5 °C)   TempSrc: Core Core Core Core   Weight:         -------------------------  BP: 91/79, Temp: 92.3 °F (33.5 °C), Pulse: 115, Resp: 20    Orders Placed This Encounter   Medications    LORazepam (ATIVAN) 2 MG/ML injection     Susanna Eagle: cabinet override    0.9 % sodium chloride bolus    norepinephrine (LEVOPHED) 16 mg in dextrose 5 % 250 mL infusion    DISCONTD: DOPamine (INTROPIN) 400 mg in dextrose 5 % 250 mL infusion    piperacillin-tazobactam (ZOSYN) 3.375 g in dextrose 5 % 50 mL IVPB (mini-bag)    DOPamine (INTROPIN) 1.6-5 MG/ML-% infusion     Susanna Eagle: cabinet override    0.9 % sodium chloride bolus    DOPamine (INTROPIN) 400 mg in dextrose 5 % 250 mL infusion           Re-evaluation Notes    During patient's stay here. A short, approximately one minute seizure activity CAT scans were ordered. Patient's blood pressure continued to trend lower despite fluids. He was eventually placed on both dopamine and leave the fat at high levels. Laboratory results came back with markedly abnormal labs including lactic acid of over 16. Sodium low white count, low. CAT scan of abdomen per radiologist, revealed ascites, but no gross pathology. I did speak with an intensivist at [facility]. Marquez's.   He wanted central line placed which was done he was given antibiotics here and patient will be transferred via helicopter because of acuity    CRITICAL CARE:   CRITICAL CARE: There was a high probability of clinically significant/life threatening deterioration in this patient's condition which required my urgent intervention. Total critical care time was 45 minutes. This excludes any time for separately reportable procedures. PROCEDURES:  Central line placement. Central line was placed due to need of multiple pressors fluid management. Patient is unable to give consent. Right anterior chest wall was prepped sterilely after cleaning. Landmarks were identified. Lidocaine was instilled to the skin and under the clavicle needle was inserted in the first attempt, revealed dark blood, easily guidewire was placed in a central line was placed over the guidewire. After flushing, all ports and find him to be patent. Afterwards they were all read flushing and find pain central line was sutured in placed and postprocedural x-ray was performed    FINAL IMPRESSION      1. Septic shock [facility])          DISPOSITION/PLAN   DISPOSITION Decision To Transfer 07/24/2020 05:42:02 AM      Condition on Disposition    Critical    PATIENT REFERRED TO:  No follow-up provider specified. DISCHARGE MEDICATIONS:  New Prescriptions    No medications on file       (Please note that portions of this note were completed with a voice recognition program.  Efforts were made to edit the dictations but occasionally words are mis-transcribed.)    Castañeda MD, F.A.C.E.P.   Attending Emergency Physician        Lashawn Beltre MD  07/24/20 8453

## 2020-07-24 NOTE — ED PROVIDER NOTES
[facility]  Emergency Department Encounter  EmergencyMedicine Resident     Pt Pretty Trevizo  MRN: [id number]  Aakash 1950  Date of evaluation: 7/24/20  PCP:  No primary care provider on file. CHIEF COMPLAINT       Chief Complaint   Patient presents with    Altered Mental Status       HISTORY OF PRESENT ILLNESS  (Location/Symptom, Timing/Onset, Context/Setting, Quality, Duration, Modifying Factors, Severity.)    This patient was evaluated in the Emergency Department for symptoms described in the history of present illness. He/she was evaluated in the context of the global COVID-19 pandemic, which necessitated consideration that the patient might be at risk for infection with the SARS-CoV-2 virus that causes COVID-19. Institutional protocols and algorithms that pertain to the evaluation of patients at risk for COVID-19 are in a state of rapid change based on information released by regulatory bodies including the CDC and federal and state organizations. These policies and algorithms were followed during the patient's care in the ED. David Hastings is a 78 y.o. male presenting as a transfer from [place] emergency department for sepsis of unknown origin after presenting for lethargy, hypoxic hypotensive. .  Found to have a lactate greater than 17. Started on Zosyn. CT abdomen pelvis did not show source but did show ascites. Started on Levophed and dopamine. Right subclavian central line placed outlying facility. In route patient became hypoglycemic. Was given D50. Able to be weaned off dopamine. Given fluids. Levophed increased to 10. Upon arrival glucose 70, spontaneous eye opening, otherwise not responsive, map of 60. PAST MEDICAL / SURGICAL / SOCIAL / FAMILY HISTORY      has a past medical history of Anemia, Atrial fibrillation ([address] CHF (congestive heart failure) ([address] Diabetes mellitus ([address] Hyperlipidemia, Hypertension, and Thyroid disease.      has no past surgical history on file.    Social History     Socioeconomic History    Marital status:      Spouse name: Not on file    Number of children: Not on file    Years of education: Not on file    Highest education level: Not on file   Occupational History    Not on file   Social Needs    Financial resource strain: Not on file    Food insecurity     Worry: Not on file     Inability: Not on file    Transportation needs     Medical: Not on file     Non-medical: Not on file   Tobacco Use    Smoking status: Not on file   Substance and Sexual Activity    Alcohol use: Not on file    Drug use: Not on file    Sexual activity: Not on file   Lifestyle    Physical activity     Days per week: Not on file     Minutes per session: Not on file    Stress: Not on file   Relationships    Social connections     Talks on phone: Not on file     Gets together: Not on file     Attends Jain service: Not on file     Active member of club or organization: Not on file     Attends meetings of clubs or organizations: Not on file     Relationship status: Not on file    Intimate partner violence     Fear of current or ex partner: Not on file     Emotionally abused: Not on file     Physically abused: Not on file     Forced sexual activity: Not on file   Other Topics Concern    Not on file   Social History Narrative    Not on file       History reviewed. No pertinent family history. Allergies:  Patient has no known allergies. Home Medications:  Prior to Admission medications    Medication Sig Start Date End Date Taking?  Authorizing Provider   acetaminophen (TYLENOL) 325 MG tablet Take 650 mg by mouth every 6 hours as needed for Pain    Historical Provider, MD   vitamin C (ASCORBIC ACID) 500 MG tablet Take 500 mg by mouth daily    Historical Provider, MD   aspirin 81 MG chewable tablet Take 81 mg by mouth daily    Historical Provider, MD   bumetanide (BUMEX) 1 MG tablet Take 1 mg by mouth 2 times daily    Historical Provider, MD cyanocobalamin 1000 MCG tablet Take 1,000 mcg by mouth daily    Historical Provider, MD   insulin glargine (LANTUS) 100 UNIT/ML injection vial Inject 10 Units into the skin nightly    Historical Provider, MD   levothyroxine (SYNTHROID) 112 MCG tablet Take 112 mcg by mouth Daily    Historical Provider, MD   magnesium oxide (MAG-OX) 400 MG tablet Take 400 mg by mouth daily    Historical Provider, MD   melatonin 3 MG TABS tablet Take 3 mg by mouth nightly as needed    Historical Provider, MD   metoprolol tartrate (LOPRESSOR) 25 MG tablet Take 25 mg by mouth daily    Historical Provider, MD   midodrine (PROAMATINE) 5 MG tablet Take 5 mg by mouth 3 times daily    Historical Provider, MD   mirtazapine (REMERON) 15 MG tablet Take 15 mg by mouth nightly    Historical Provider, MD   insulin aspart (NOVOLOG) 100 UNIT/ML injection vial Inject 6 Units into the skin 2 times daily Indications: every evening    Historical Provider, MD   ondansetron (ZOFRAN) 4 MG tablet Take 4 mg by mouth every 8 hours as needed for Nausea or Vomiting    Historical Provider, MD   rivaroxaban (XARELTO) 20 MG TABS tablet Take 20 mg by mouth    Historical Provider, MD       REVIEW OF SYSTEMS    (2-9 systems for level 4, 10 or more for level 5)      Unable to obtain due to clinical condition    PHYSICAL EXAM   (up to 7 for level 4, 8 or more for level 5)      INITIAL VITALS:   BP (!) 76/58   Pulse 92   Temp 93.2 °F (34 °C) (Core)   Resp 22   Ht 6' 4\" (1.93 m)   Wt 246 lb 14.6 oz (112 kg)   SpO2 (!) 69%   BMI 30.06 kg/m²     Physical Exam  Constitutional:       General: He is in acute distress. Appearance: He is ill-appearing and toxic-appearing. HENT:      Head: Normocephalic and atraumatic. Nose: Nose normal.      Mouth/Throat:      Mouth: Mucous membranes are moist.      Comments: Frothy blood-tinged sputum  Eyes:      Extraocular Movements: Extraocular movements intact.       Conjunctiva/sclera: Conjunctivae normal. Comments: Pupils 3 to 4 mm, sluggishly reactive to light   Neck:      Musculoskeletal: Neck supple. Cardiovascular:      Rate and Rhythm: Tachycardia present. Rhythm irregular. Comments: Central line, right subclavian. Left EJ  Pulmonary:      Effort: Respiratory distress present. Breath sounds: Rhonchi and rales present. Comments: Diminished breath sounds bilaterally  Abdominal:      General: There is distension. Tenderness: There is no guarding or rebound. Comments: No grimacing with palpation   Genitourinary:     Penis: Normal.       Comments: Small ulcer scrotum  Musculoskeletal:         General: Swelling present.    Skin:     Comments: Cool, dry   Neurological:      Comments: spont eye opening, pupils sluggishly reactive, not responsive to verbal or pain stimuli           DIFFERENTIAL  DIAGNOSIS     PLAN (LABS / Stephon Castleman / EKG):  Orders Placed This Encounter   Procedures    INTUBATION    Culture, Blood 2    Culture, Blood 1    Culture, Respiratory    XR CHEST PORTABLE    Basic Metabolic Panel w/ Reflex to MG    Lactic Acid, Plasma    CBC Auto Differential    BLOOD GAS, ARTERIAL    COVID-19    Blood gas, arterial    Hemoglobin and hematocrit, blood    SODIUM (POC)    POTASSIUM (POC)    CHLORIDE (POC)    CALCIUM, IONIC (POC)    CK    Myoglobin, Serum    HEMOGLOBIN AND HEMATOCRIT, BLOOD    Protime-INR    APTT    D-Dimer, Quantitative    Fibrinogen    Troponin    Troponin    Protime-INR, Post Transfusion    T4    TSH with Reflex    Blood gas, arterial    T4, Free    BLOOD GAS, ARTERIAL    PROTIME-INR    Fibrinogen, Post Transfusion    Hemoglobin and hematocrit, blood    SODIUM (POC)    POTASSIUM (POC)    CHLORIDE (POC)    CALCIUM, IONIC (POC)    Lactate, Sepsis    Troponin    CK    Amylase    Lipase    Comprehensive Metabolic Panel w/ Reflex to MG    Blood gas, arterial    CBC auto differential    Hemoglobin and hematocrit, blood    MRSA by PCR    Basic Metabolic Panel    CK    Diet NPO Effective Now    VITAL SIGNS PER TRANSFUSION PROTOCOL    TRANSFUSION REACTION MANAGEMENT    Verify informed consent    Telemetry Monitoring    Spontaneous Awakening Trial (SAT)    VITAL SIGNS PER TRANSFUSION PROTOCOL    Verify informed consent    TRANSFUSION REACTION MANAGEMENT    Vital signs per unit routine    Daily weights    Intake and output    Telemetry monitoring - continuous duration    Strict Bedrest    Neurovascular checks    Initiate CVP monitoring    Initiate MAP monitoring    Place intermittent pneumatic compression device    Elevate heels off of bed at all times if patient is not able to move lower extremities    Turn or assist with turn every 2 hours if patient is unable to turn self. Remind patient to turn if necessary.     Inspect skin per unit guidelines    Maintain HOB at the lowest elevation consistent with medical plan of care    Misc nursing order (specify)    HYPOGLYCEMIA TREATMENT: blood glucose less than 50 mg/dL and patient  ALERT and TOLERATING PO    HYPOGLYCEMIA TREATMENT: blood glucose less than 70 mg/dL and patient ALERT and TOLERATING PO    HYPOGLYCEMIA TREATMENT: blood glucose less than 70 mg/dL and patient NOT ALERT or NPO    DNR comfort care - arrest    Inpatient consult to [address] [place] [address] A Inpatient consult to Dietitian    Inpatient consult to Nephrology    Inpatient consult to Cardiology    Inpatient consult to Palliative Care    Post Extubation Oxygen Therapy    Capnography    Pulse oximetry, continuous    Post Extubation Oxygen Therapy    Initiate RT Adult Mechanical Ventilation Protocol    Mechanical Ventilation with default initial settings    ABG draw    Pulse oximetry, continuous    Initiate Oxygen Therapy Protocol    POC Glucose Fingerstick    Venous Blood Gas, POC    Creatinine W/GFR Point of Care    Lactic Acid, POC    POCT Glucose    Anion Gap (Calc) POC    POC Glucose Fingerstick    POCT Glucose    POC Glucose Fingerstick    POCT Glucose    POC Glucose Fingerstick    Venous Blood Gas, POC    Lactic Acid, POC    POCT Glucose    POC Glucose Fingerstick    Arterial Blood Gas, POC    Creatinine W/GFR Point of Care    Lactic Acid, POC    POCT Glucose    Anion Gap (Calc) POC    POCT Glucose    POCT Glucose    EKG 12 Lead    ECHO Complete 2D W Doppler W Color    TYPE AND SCREEN    PREPARE FRESH FROZEN PLASMA, 2 Units    PREPARE CRYOPRECIPITATE (CROSSMATCH), 1 Product    PATIENT STATUS (FROM ED OR OR/PROCEDURAL) Inpatient       MEDICATIONS ORDERED:  Orders Placed This Encounter   Medications    EPINEPHrine 1 MG/10ML injection     Chitra Tan: cabinet override    midazolam (VERSED) 2 MG/2ML injection     DOMO BLACKWOOD: cabinet override    MIDAZOLAM 1 MG/ML IN D5W INFUSION     LIBIA ALFREDO: cabinet override    norepinephrine (LEVOPHED) 16 mg in sodium chloride 0.9 % 250 mL infusion    midazolam (VERSED) 1 mg/mL in D5W infusion    vasopressin 20 Units in dextrose 5 % 100 mL infusion    vancomycin (VANCOCIN) 1500 mg in dextrose 5 % 250 mL IVPB    chlorhexidine (PERIDEX) 0.12 % solution 15 mL    DISCONTD: famotidine (PEPCID) injection 20 mg    DISCONTD: dextrose 10 % infusion    hydrocortisone sodium succinate PF (SOLU-CORTEF) injection 100 mg    midazolam (VERSED) injection 2 mg    DISCONTD: albumin human 5 % IV solution 25 g    0.9 % sodium chloride bolus    0.9 % sodium chloride bolus    DISCONTD: midodrine (PROAMATINE) tablet 5 mg    DISCONTD: levothyroxine (SYNTHROID) injection 75 mcg     Order Specific Question:   Is levothyroxine IV being used to treat myxedema coma? Answer:   No    AND Linked Order Group     levothyroxine (SYNTHROID) injection 62.5 mcg      Order Specific Question:   Is levothyroxine IV being used to treat myxedema coma?       Answer:   No     sodium chloride (PF) 0.9 % injection 5 mL    0.9 % sodium chloride bolus Staging NOT REPORTED    Lactic Acid, Plasma   Result Value Ref Range    Lactic Acid NOT REPORTED mmol/L    Lactic Acid, Whole Blood 19.0 (H) 0.7 - 2.1 mmol/L   CBC Auto Differential   Result Value Ref Range    WBC 4.7 3.5 - 11.3 k/uL    RBC 3.68 (L) 4.21 - 5.77 m/uL    Hemoglobin 10.9 (L) 13.0 - 17.0 g/dL    Hematocrit 38.3 (L) 40.7 - 50.3 %    .1 (H) 82.6 - 102.9 fL    MCH 29.6 25.2 - 33.5 pg    MCHC 28.5 28.4 - 34.8 g/dL    RDW 21.8 (H) 11.8 - 14.4 %    Platelets 641 603 - 564 k/uL    MPV 11.6 8.1 - 13.5 fL    NRBC Automated 4.9 (H) 0.0 per 100 WBC    Differential Type NOT REPORTED     WBC Morphology NOT REPORTED     RBC Morphology NOT REPORTED     Platelet Estimate NOT REPORTED     Immature Granulocytes 2 (H) 0 %    Seg Neutrophils 70 (H) 36 - 66 %    Lymphocytes 22 (L) 24 - 44 %    Monocytes 5 1 - 7 %    Eosinophils % 1 1 - 4 %    Basophils 0 0 - 2 %    nRBC 7 (H) 0 per 100 WBC    Absolute Immature Granulocyte 0.09 0.00 - 0.30 k/uL    Segs Absolute 3.29 1.8 - 7.7 k/uL    Absolute Lymph # 1.03 1.0 - 4.8 k/uL    Absolute Mono # 0.24 0.1 - 0.8 k/uL    Absolute Eos # 0.05 0.0 - 0.4 k/uL    Basophils Absolute 0.00 0.0 - 0.2 k/uL    Morphology ANISOCYTOSIS PRESENT     Morphology MACROCYTOSIS PRESENT     Morphology 1+ POLYCHROMASIA     Morphology 1+ ACANTHOCYTES     Morphology 1+ TEARDROPS    COVID-19    Specimen: Other   Result Value Ref Range    SARS-CoV-2          SARS-CoV-2, Rapid Not Detected Not Detected    Source . NASOPHARYNGEAL SWAB     SARS-CoV-2, PCR         Hemoglobin and hematocrit, blood   Result Value Ref Range    POC Hemoglobin 13.1 (L) 13.5 - 17.5 g/dL    POC Hematocrit 39 (L) 41 - 53 %   SODIUM (POC)   Result Value Ref Range    POC Sodium 128 (L) 138 - 146 mmol/L   POTASSIUM (POC)   Result Value Ref Range    POC Potassium 4.5 3.5 - 4.5 mmol/L   CHLORIDE (POC)   Result Value Ref Range    POC Chloride 98 98 - 107 mmol/L   CALCIUM, IONIC (POC)   Result Value Ref Range    POC Ionized Calcium 1.05 (L) 1.15 - 1.33 mmol/L   CK   Result Value Ref Range    Total  39 - 308 U/L   Myoglobin, Serum   Result Value Ref Range    Myoglobin 546 (H) 28 - 72 ng/mL   Protime-INR   Result Value Ref Range    Protime 79.3 (H) 9.0 - 12.0 sec    INR 8.2 (HH)    APTT   Result Value Ref Range    PTT 87.1 (HH) 20.5 - 30.5 sec   HEMOGLOBIN AND HEMATOCRIT, BLOOD   Result Value Ref Range    Hemoglobin 10.9 (L) 13.0 - 17.0 g/dL    Hematocrit 37.4 (L) 40.7 - 50.3 %   D-Dimer, Quantitative   Result Value Ref Range    D-Dimer, Quant 11.69 mg/L FEU   Fibrinogen   Result Value Ref Range    Fibrinogen 117 (L) 140 - 420 mg/dL   Troponin   Result Value Ref Range    Troponin, High Sensitivity 254 (HH) 0 - 22 ng/L    Troponin T NOT REPORTED <0.03 ng/mL    Troponin Interp NOT REPORTED    Troponin   Result Value Ref Range    Troponin, High Sensitivity 248 (HH) 0 - 22 ng/L    Troponin T NOT REPORTED <0.03 ng/mL    Troponin Interp NOT REPORTED    T4   Result Value Ref Range    T4, Total 4.4 (L) 4.5 - 10.9 ug/dL   TSH with Reflex   Result Value Ref Range    TSH 29.00 (H) 0.30 - 5.00 mIU/L   T4, Free   Result Value Ref Range    Thyroxine, Free 1.09 0.93 - 1.70 ng/dL   PROTIME-INR   Result Value Ref Range    Protime 33.6 (H) 9.0 - 12.0 sec    INR 3.4    Hemoglobin and hematocrit, blood   Result Value Ref Range    POC Hemoglobin 13.1 (L) 13.5 - 17.5 g/dL    POC Hematocrit 39 (L) 41 - 53 %   SODIUM (POC)   Result Value Ref Range    POC Sodium 126 (L) 138 - 146 mmol/L   POTASSIUM (POC)   Result Value Ref Range    POC Potassium 4.3 3.5 - 4.5 mmol/L   CHLORIDE (POC)   Result Value Ref Range    POC Chloride 96 (L) 98 - 107 mmol/L   CALCIUM, IONIC (POC)   Result Value Ref Range    POC Ionized Calcium 0.98 (L) 1.15 - 1.33 mmol/L   Lactate, Sepsis   Result Value Ref Range    Lactic Acid, Sepsis NOT REPORTED 0.5 - 1.9 mmol/L    Lactic Acid, Sepsis, Whole Blood 18.0 (H) 0.5 - 1.9 mmol/L   Lactate, Sepsis   Result Value Ref Range    Lactic Acid, Sepsis NOT REPORTED 0.5 - 1.9 mmol/L    Lactic Acid, Sepsis, Whole Blood 20.0 (H) 0.5 - 1.9 mmol/L   Lactate, Sepsis   Result Value Ref Range    Lactic Acid, Sepsis NOT REPORTED 0.5 - 1.9 mmol/L    Lactic Acid, Sepsis, Whole Blood 21.0 (H) 0.5 - 1.9 mmol/L   Troponin   Result Value Ref Range    Troponin, High Sensitivity 230 (HH) 0 - 22 ng/L    Troponin T NOT REPORTED <0.03 ng/mL    Troponin Interp NOT REPORTED    Troponin   Result Value Ref Range    Troponin, High Sensitivity 223 (HH) 0 - 22 ng/L    Troponin T NOT REPORTED <0.03 ng/mL    Troponin Interp NOT REPORTED    CK   Result Value Ref Range    Total  39 - 308 U/L   Amylase   Result Value Ref Range    Amylase 124 (H) 28 - 100 U/L   Lipase   Result Value Ref Range    Lipase 62 (H) 13 - 60 U/L   Basic Metabolic Panel   Result Value Ref Range    Glucose 94 70 - 99 mg/dL    BUN 61 (H) 8 - 23 mg/dL    CREATININE 2.84 (H) 0.70 - 1.20 mg/dL    Bun/Cre Ratio NOT REPORTED 9 - 20    Calcium 9.0 8.6 - 10.4 mg/dL    Sodium 127 (L) 135 - 144 mmol/L    Potassium 5.1 3.7 - 5.3 mmol/L    Chloride 85 (L) 98 - 107 mmol/L    CO2 <6 (LL) 20 - 31 mmol/L    Anion Gap  9 - 17 mmol/L     Unable to calculate anion gap due to CO2 less than 6.     GFR Non-African American 22 (L) >60 mL/min    GFR  27 (L) >60 mL/min    GFR Comment          GFR Staging NOT REPORTED    CK   Result Value Ref Range    Total  (H) 39 - 308 U/L   POC Glucose Fingerstick   Result Value Ref Range    POC Glucose 73 (L) 75 - 110 mg/dL   Venous Blood Gas, POC   Result Value Ref Range    pH, [place] 6.853 (LL) 7.320 - 7.430    pCO2, [place] 52.6 (H) 41.0 - 51.0 mm Hg    pO2, [place] 58.3 (H) 30.0 - 50.0 mm Hg    HCO3, Venous 9.2 (L) 22.0 - 29.0 mmol/L    Total CO2, Venous 11 (L) 23.0 - 30.0 mmol/L    Negative Base Excess, [place] 24 (H) 0.0 - 2.0    Positive Base Excess, [place] NOT REPORTED 0.0 - 3.0    O2 Sat, [place] 64 60.0 - 85.0 %    O2 Device/Flow/% NOT REPORTED     Jose Test NOT REPORTED     Sample Site NOT REPORTED Mode NOT REPORTED     FIO2 NOT REPORTED     Pt Temp NOT REPORTED     POC pH Temp NOT REPORTED     POC pCO2 Temp NOT REPORTED mm Hg    POC pO2 Temp NOT REPORTED mm Hg   Creatinine W/GFR Point of Care   Result Value Ref Range    POC Creatinine 3.19 (H) 0.51 - 1.19 mg/dL    GFR Comment 24 (L) >60 mL/min    GFR Non- 19 (L) >60 mL/min    GFR Comment         Lactic Acid, POC   Result Value Ref Range    POC Lactic Acid 17.46 (H) 0.56 - 1.39 mmol/L   POCT Glucose   Result Value Ref Range    POC Glucose 197 (H) 74 - 100 mg/dL   Anion Gap (Calc) POC   Result Value Ref Range    Anion Gap 21 (H) 7 - 16 mmol/L   POC Glucose Fingerstick   Result Value Ref Range    POC Glucose 119 (H) 75 - 110 mg/dL   POCT Glucose   Result Value Ref Range    Glucose 168 mg/dL    QC OK? ok    POC Glucose Fingerstick   Result Value Ref Range    POC Glucose 168 (H) 75 - 110 mg/dL   POCT Glucose   Result Value Ref Range    Glucose 150 mg/dL    QC OK? ok    POC Glucose Fingerstick   Result Value Ref Range    POC Glucose 150 (H) 75 - 110 mg/dL   Venous Blood Gas, POC   Result Value Ref Range    pH, [place] 6.870 (LL) 7.320 - 7.430    pCO2, [place] 54.2 (H) 41.0 - 51.0 mm Hg    pO2, [place] 40.5 30.0 - 50.0 mm Hg    HCO3, Venous 9.9 (L) 22.0 - 29.0 mmol/L    Total CO2, Venous 12 (L) 23.0 - 30.0 mmol/L    Negative Base Excess, [place] 24 (H) 0.0 - 2.0    Positive Base Excess, [place] NOT REPORTED 0.0 - 3.0    O2 Sat, [place] 42 (L) 60.0 - 85.0 %    O2 Device/Flow/% O2 Mask     Jose Test NEGATIVE     Sample Site Right Brachial Artery     Mode PRVC     FIO2 100.0     Pt Temp 33.5     POC pH Temp 6.91     POC pCO2 Temp 47 mm Hg    POC pO2 Temp 32 mm Hg   Lactic Acid, POC   Result Value Ref Range    POC Lactic Acid 18.10 (H) 0.56 - 1.39 mmol/L   POCT Glucose   Result Value Ref Range    POC Glucose 130 (H) 74 - 100 mg/dL   POC Glucose Fingerstick   Result Value Ref Range    POC Glucose 140 (H) 75 - 110 mg/dL   Arterial Blood Gas, POC   Result Value Ref Range POC pH 7.024 (LL) 7.350 - 7.450    POC pCO2 32.6 (L) 35.0 - 48.0 mm Hg    POC PO2 58.8 (L) 83.0 - 108.0 mm Hg    POC HCO3 8.5 (LL) 21.0 - 28.0 mmol/L    TCO2 (calc), Art 10 (L) 22.0 - 29.0 mmol/L    Negative Base Excess, Art 21 (H) 0.0 - 2.0    Positive Base Excess, Art NOT REPORTED 0.0 - 3.0    POC O2 SAT 76 (L) 94.0 - 98.0 %    O2 Device/Flow/% Adult Ventilator     Jose Test POSITIVE     Sample Site Right Radial Artery     Mode PRVC     FIO2 100.0     Pt Temp NOT REPORTED     POC pH Temp NOT REPORTED     POC pCO2 Temp NOT REPORTED mm Hg    POC pO2 Temp NOT REPORTED mm Hg   Creatinine W/GFR Point of Care   Result Value Ref Range    POC Creatinine 2.93 (H) 0.51 - 1.19 mg/dL    GFR Comment 26 (L) >60 mL/min    GFR Non-African American 22 (L) >60 mL/min    GFR Comment         Lactic Acid, POC   Result Value Ref Range    POC Lactic Acid 12.30 (H) 0.56 - 1.39 mmol/L   POCT Glucose   Result Value Ref Range    POC Glucose 141 (H) 74 - 100 mg/dL   Anion Gap (Calc) POC   Result Value Ref Range    Anion Gap 22 (H) 7 - 16 mmol/L   TYPE AND SCREEN   Result Value Ref Range    Expiration Date 07/27/2020,2359     Arm Band Number [id number]     ABO/Rh O POSITIVE     Antibody Screen NEGATIVE    PREPARE FRESH FROZEN PLASMA, 2 Units   Result Value Ref Range    Unit Number [id number]     Product Code Fresh Plasma     Unit Divison 00     Dispense Status ISSUED     Transfusion Status OK TO TRANSFUSE     Unit Number [id number]     Product Code Fresh Plasma     Unit Divison 00     Dispense Status ISSUED     Transfusion Status OK TO TRANSFUSE    PREPARE CRYOPRECIPITATE (CROSSMATCH), 1 Product   Result Value Ref Range    Unit Number [id number]     Product Code Pooled Cryoprecipitate     Unit Divison 00     Dispense Status ISSUED     Transfusion Status OK TO TRANSFUSE          RADIOLOGY:  XR CHEST PORTABLE   Final Result   Interval endotracheal and enteric intubation.       Cardiomegaly and multifocal ground-glass opacities within the lungs   bilaterally. EKG  A. fib with RVR, rate of 101, left axis deviation, left bundle branch block    All EKG's are interpreted by the Emergency Department Physician who either signs or Co-signs this chart in the absence of a cardiologist.    IMPRESSION/MDM/EMERGENCY DEPARTMENT COURSE:  Patient came to emergency department, HPI and physical exam were conducted. All nursing notes were reviewed. 56-year-old male presenting the emergency department for severe sepsis and septic shock. Transferred from [facility] in the emergency department. Unknown source of sepsis. Presented from nursing home due to lethargy and increased work of breathing. Lactate greater than 17. Given fluids, central line placed, started on Levophed and dopamine at outlying facility. Also given Zosyn prior to transport. In route patient became hypoglycemic requiring D50. Upon arrival glucose 73. Dopamine weaned off in route and levo fed up to 10 upon arrival.    On exam patient demonstrating spontaneous eye opening but not tracking with eyes, no retraction to pain bilateral upper extremities, no verbal response. Extremely edematous with 3+ edema extending to waist.  Abdomen also distended. No grimacing with palpation. Lung sounds are distant. Respirations shallow and tachypneic. Hypotensive upon arrival, systolic of 60. Levophed increased to 15. Shortly after arrival patient's eyes closed, unable to palpate pulse both carotid and femoral checked. CPR initiated at 7:03 AM, rhythm PEA. Patient received epinephrine x2, bicarb, additional D50 during resuscitation. Also was intubated during this time. CPR terminated at 7:11 AM with ROSC. Patient's eyes opening again, sluggishly responsive. Spontaneous movements of upper extremities. Movements likely not purposeful. Saturations 40 to 50% post intubation. ET tube confirmed on x-ray, advanced 2 cm.   Vasopressin ordered due to concern that blood pressure may drop after epinephrine from code wears off. Systolic in the 45N. Chest x-ray concerning for patchy infiltrate. COVID swab ordered. Repeat labs also ordered. Given 100 mg Solu-Cortef due to likely adrenal suppression which may be removed reason for hypoglycemia. INR elevated, DIC labs ordered, normal platelets. Added vancomycin given patient was only given Zosyn prior to transport. Received 2-1/2 units of crystalloid fluid prior to arrival, sodium chloride continued upon arrival.  Hyponatremic 127 which is improved from sodium at outlying facility which is 125, spoke with nephrology who recommended continue crystalloids and advised 25 mg albumin. End-tidal CO2 dropped from initial 30-40 down to 15 and stabilized at 15. Multiple discussions had with patient's family explaining how severity of patient's illness and likely outcome. Family verbalized understanding of critical condition the patient was in, in ED decision was made to keep patient full code as this was patient's last wishes despite previously being DNR CC. Pt admitted to ICU. ED Course as of Jul 24 1816 Fri Jul 24, 2020   0827 Nephrology discussed with nephrology recommend continuing crystalloid fluids, albumin 25 mg    [ZT]   0930 Spoke with ICU team, they have agreed except the [place] is negative, will put an admit orders, cardiology consult due to concern for cardiogenic shock and arrest, will obtain repeat troponin, will give 2 units FFP after discussion with ICU team.    [ZT]   1013 Critical care team at bedside. Requesting TSH and free T4 levels.     [ZT]      ED Course User Index  [ZT] Pooja Marie DO             PROCEDURES:  Intubation    Date/Time: 7/24/2020 8:09 AM  Performed by: Pooja aMrie DO  Authorized by: Dona Herrera MD     Consent:     Consent obtained:  Emergent situation  Pre-procedure details:     Patient status:  Unresponsive    Pretreatment medications:  None    Paralytics:  None  Procedure details: Preoxygenation:  Bag valve mask    CPR in progress: yes      Intubation method:  Oral    Oral intubation technique:  Video-assisted    Laryngoscope blade: Mac 4    Tube size (mm):  8.0    Tube type:  Cuffed    Number of attempts:  1    Ventilation between attempts: no      Cricoid pressure: yes      Tube visualized through cords: yes    Placement assessment:     ETT to lip:  24    Tube secured with:  ETT mcdaniels    Breath sounds:  Equal and absent over the epigastrium    Placement verification: chest rise, condensation, CXR verification and ETCO2 detector      CXR findings:  ETT in proper place (needs advanced 2-3cm)  Post-procedure details:     Patient tolerance of procedure: Tolerated well, no immediate complications  Comments: Will advance ET tube 2-3cm        CONSULTS:  IP CONSULT TO CRITICAL CARE  IP CONSULT TO DIETITIAN  IP CONSULT TO NEPHROLOGY  IP CONSULT TO CARDIOLOGY  IP CONSULT TO PALLIATIVE CARE    CRITICAL CARE:  None    FINAL IMPRESSION      1. Septicemia ([address]    2. Lactic acidosis    3. Acute respiratory failure, unspecified whether with hypoxia or hypercapnia ([address]    4. Hypoglycemia          DISPOSITION / PLAN     DISPOSITION Admitted 07/24/2020 09:36:26 AM      PATIENT REFERRED TO:  No follow-up provider specified.     DISCHARGE MEDICATIONS:  Current Discharge Medication List          Berlin Reyes DO  Emergency Medicine Resident    (Please note that portions of thisnote were completed with a voice recognition program.  Efforts were made to edit the dictations but occasionally words are mis-transcribed.)        Berlin Reyes DO  Resident  07/24/20 [id number]

## 2020-07-24 NOTE — ED NOTES
Dr. Streeter Sensing [facility] speaking to Dr. Dionne Tapia RN stated ICU wont have a bed till after shift change     Mariely Mcmahon RN  07/24/20 7182

## 2020-07-24 NOTE — PROGRESS NOTES
Comprehensive Nutrition Assessment    Type and Reason for Visit:  Initial, Consult(auto consult for TF recs d/t vent)    Nutrition Recommendations/Plan: Start nutrition as able. If nutrition support needed, suggest Renal formula goal 40 ml/hr vs starting with custom TPN of 200 g dex, 50 g AA at 41.7 ml/hr. Will continue to follow/monitor care plans. Nutrition Assessment:  Pt admitted with septic shock, acute respiratory failure, s/p cardiac arrest. Pt is currently intubated. Meds/labs reviewed. Malnutrition Assessment:  Malnutrition Status:  Insufficient data    Context:  Chronic Illness     Findings of the 6 clinical characteristics of malnutrition:  Energy Intake:  Unable to assess  Weight Loss:  Unable to assess     Body Fat Loss:  Unable to assess     Muscle Mass Loss:  Unable to assess    Fluid Accumulation:  7 - Severe Extremities, Generalized   Strength:  Not Performed    Estimated Daily Nutrient Needs:  Energy (kcal):  1600 kcal/day; Weight Used for Energy Requirements:  Current     Protein (g):  110 g pro/day; Weight Used for Protein Requirements:  Ideal(1.2)          Current Nutrition Therapies:    Diet NPO Effective Now    Anthropometric Measures:  · Height: 6' 4\" (193 cm)  · Current Body Weight: 246 lb 14.6 oz (112 kg)   · Ideal Body Weight: 202 lbs; % Ideal Body Weight  122%   · BMI: 30.1  · BMI Categories: Obese Class 1 (BMI 30.0-34. 9)       Nutrition Diagnosis:   · Inadequate oral intake related to impaired respiratory funtion as evidenced by NPO or clear liquid status due to medical condition    Nutrition Interventions:   Food and/or Nutrient Delivery: Start nutrition as able. If nutrition support needed, suggest Renal formula goal 40 ml/hr vs starting with custom TPN of 200 g dex, 50 g AA at 41.7 ml/hr.    Nutrition Education/Counseling:  Education not indicated   Coordination of Nutrition Care:  Continued Inpatient Monitoring    Goals:  meet % of estimated nutrition needs Nutrition Monitoring and Evaluation:      Food/Nutrient Intake Outcomes:  Diet Advancement/Tolerance  Physical Signs/Symptoms Outcomes:  Biochemical Data, Nutrition Focused Physical Findings, Skin, Weight, Fluid Status or Edema     Electronically signed by Sarah Baumann RD, LD on 7/24/20 at 3:59 PM EDT    Contact: [phone]

## 2020-07-24 NOTE — PROGRESS NOTES
RAPID Covid 19 swab taken from left nare, labeled, placed in red dot bag, and handed off to second healthcare worker outside of room for transport to laboratory per hospital policy and procedure. Patient tolerated procedure well.     Swab placed at ER coordinators desk for lab

## 2020-07-24 NOTE — ED NOTES
Patient is moving extremities. Patient blinking and moving eyes, biting tube. . CPR stopped.       Cesario Keyes RN  07/24/20 7781

## 2020-07-25 NOTE — PLAN OF CARE
Problem: Cardiac:  Goal: Ability to maintain an adequate cardiac output will improve  Description: Ability to maintain an adequate cardiac output will improve  Outcome: Ongoing  Goal: Hemodynamic stability will improve  Description: Hemodynamic stability will improve  Outcome: Ongoing     Problem: Fluid Volume:  Goal: Ability to achieve and maintain adequate urine output will improve  Description: Ability to achieve and maintain adequate urine output will improve  Outcome: Ongoing     Problem: Respiratory:  Goal: Respiratory status will improve  Description: Respiratory status will improve  Outcome: Ongoing

## 2020-07-25 NOTE — DEATH NOTES
ICU DEATH NOTE     PATIENT NAME: Bar Conteh  YOB: 1950  MEDICAL RECORD NO. [id number]  DATE: 7/25/2020  PRIMARY CARE PHYSICIAN: No primary care provider on file. DIAGNOSIS OF DEATH     I have confirmed the death of this patient in accordance with accepted medical standards. The patient is dead as evidenced by cardiac death or cessation of brain function:    Cardiac Death (check all that apply):     [x]  Absence of respiratory effort by observation     [x]  Absence of pulse by palpation     []  Absence of blood pressure by sphygmomanometry     [x]  Absence of sustainable cardiac rhythm by monitor    Death by Cessation of Brain Function (check all that apply):     [x]  Absence of Cerebral Function with no motor response     []  Absence of brain stem function by systemic physical exam     []  Failure to respond with respiratory drive by apnea test     []  Cerebral Electrical silence as interpreted by qualified reader        OR     []  Absence of brain blood flow by radiologic technique      CERTIFICATION OF DEATH     I have pronounced the patient dead on:     Date: 7/25/2020 at 1:56 PM    NOTIFICATIONS     Attending physician that will sign Death Certificate: Dr. Maurilio Cota notified: Name and/or Relationship: Rain(daughter), Rosalina(daughter) at bedside along with other family members.    []  Per Nursing     notified (Name):                                                          [x]  Per Nursing      Britta Rodgers MD  Critical Care Resident,   Department of Internal Medicine/ Critical care-PGY [address] ([facility])   7/25/2020, 1:56 PM

## 2020-07-25 NOTE — PROGRESS NOTES
Attending Physician Statement  I have discussed the care of Anish Mcghee, including pertinent history and exam findings with the resident. I have reviewed the key elements of all parts of the encounter with the resident. I have seen and examined the patient with the resident. I agree with the assessment and plan and status of the problem list as documented. Please see full note by critical care resident. I have reviewed the chart, events from yesterday seen, I have reviewed the labs, arterial blood gases and ventilator setting seen. He is continued on pressors and required increased dosing of pressors he is on maximum dose of vasopressin, Levophed, Pete-Synephrine, and epinephrine remained tachycardic with sinus tachycardia, when I saw him this morning he was not responsive although he require small dose of Versed drip as he was more tachypneic when he is off Versed. His lactic acid remains high and remains over 20 he is on bicarbonate drip his bicarbonate is less than 6 continue with severely acidotic with with VBG this morning 7.0 3/34/44/9, oxygen saturation reported to be low to mid 80s sometimes 70% 100% FiO2 ventilator setting PRVC/20/610/16/100 percent FiO2. Other labs shows INR of 4.2 sodium is 127 BUN increased to 3.08 BUN is 60 potassium is 5.0 he has no to minimal urine output, WBC count is 4.5 hemoglobin 10.8 and platelet count 880. He had received 1 cryo 2 fresh frozen plasma and vitamin K yesterday INR today is 4.2. He remains severely anasarca with edema of all extremities and abdominal wall edema, echocardiogram was reported which shows severely reduced LV function as before less than 10%. His overall prognosis is grim and very poor with multiorgan failure secondary to cardiogenic shock secondary to severe cardiomyopathy and anasarca.   2 daughters have decided to change the CODE STATUS to DNR CCA yesterday and this afternoon 2 daughters will meet and possibly withdrawing support, have

## 2020-07-25 NOTE — PROGRESS NOTES
Daughters, Patience and Nadira at bedside. They are both open to education about pt's plan of care. Gave detailed information about purposes and side effects his current interventions, and his vital signs. Encouraged daughters to express their needs; offered water, privacy, music, TV and  support. No further questions at this time.

## 2020-07-25 NOTE — CODE DOCUMENTATION
Writer was informed by the nurse that family is willing to change the CODE STATUS to DNR CC and ready for terminal extubation. Writer went to bedside. Patient's 2 daughters [place] and Kriss Culver were present in the room along with other family members. They all agree and confirmed that they wanted to change the patient's CODE STATUS to DNR CC and are willing for terminal extubation. DNR CC documentation failed and signed by myself and witnessed by the RN. Orders for CODE STATUS changed to DNR CC and terminal extubation were placed. Gregorio Beltre MD  Internal Medicine Resident, PGY-2  [facility];  [place], [place]  7/25/2020, 1:21 PM

## 2020-07-25 NOTE — FLOWSHEET NOTE
Assessment: Family gathered prior to terminal wean. Anticipatory grieving. Declined prayer. Intervention:  provided emotional and grief support. Outcome: Family grieving and gathering themselves prior to terminal wean. Chaplains remain available to support family as needed/requested.        07/25/20 1300   Encounter Summary   Services provided to: Patient and family together   Referral/Consult From: [facility]   (7/25/2020)   Length of Encounter 30 minutes   Spiritual Assessment Completed Yes   Grief and Life Adjustment   Type End of life   Assessment Anxious;Grieving   Intervention Grief care   Outcome Grieving;Expressed gratitude

## 2020-07-25 NOTE — PROGRESS NOTES
Pt receiving:  Levophed 50 mcg/min  Vasopressin 0.4 units/min  NeoSynephrine 300 mcg/min  Epinepherine 10 mcg/min  Bicarb gtt 3 amps in 1L of D5% infusing at 75mL/hr   Versed 1mg/hr (for RR control, decreasing guppy breathing)    Pt breathing RR 26-27, vent set at 20. Unable to get an accurate Spo2 most of the night due to limited peripheral perfusion. Made multiple attempts use to fingers, nose, ears and lip for Spo2 monitoring. The last accurate Spo2 was in the 80s around 8pm prior to adding last pressor. PEEP increased from 14 to 16 at that time, Fio2 still 100%. Pt continues to have GCS of 3. Daughters at bedside overnight. They report they'll probably plan to withdraw heroic measures in the AM \"if he doesn't make improvement\". Family questions answered as they come. Educated repeatedly about pt's illness, treatments, side effects of treatments, grim prognosis, and options for continuing with heroic measures or withdrawing lifesaving measures. MAP maintaining around 65 via noninvasive BP cuff pressure. HR 120s-140s, Afib     Nikki hugger on pt due to low temperature. Initially ~34.5 degrees from esophogeal temp probe at start of night shift. Temp now 36.7. 2mL UOP all night.

## 2020-07-25 NOTE — FLOWSHEET NOTE
Assessment: Patient is intubated and not alert/awake. Daughter Tess Mcgovern (sp?) is at bedside. Terminal extubation planned for early this afternoon after additional family arrives (patient's other daughter with her adult children). Daughter is tearful. Engaged in conversation but reticent. Per daughter, patient is [place]. Intervention:  provided emotional and grief support. Provided prayer and also a prayer blanket. Outcome and Plan: Daughter grieving and expressed gratitude. Chaplains remain available to assist as needed/requested. 07/25/20 1010   Encounter Summary   Services provided to: Patient and family together   Referral/Consult From: Other    Support System Family members   Continue Visiting   (7/25/2020)   Complexity of Encounter High   Length of Encounter 45 minutes   Grief and Life Adjustment   Type End of life   Assessment Grieving; Unable to respond   Intervention Discussed belief system/Catholic practices/ashley; Explored coping resources;Prayer   Outcome Venting emotion;Grieving;Expressed gratitude

## 2020-07-25 NOTE — CONSULTS
[facility] Cardiology Cardiology    Consult / H&P               Today's Date: 7/25/2020  Patient Name: Yan Pabon  Date of admission: 7/24/2020  6:55 AM  Patient's age: 78 y.o., 1950  Admission Dx: Septic shock ([facility] [address] [A41.9, R65.21]    Reason for Consult:  Cardiac evaluation    Requesting Physician: Rose Marie hWipple MD    CHIEF COMPLAINT: Lethargy. 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 fib on Xarelto, type 2 diabetes, hypertension, s/p thyroidectomy on Synthroid. Patient initially transferred from nursing home for lethargy, hypoglycemia and hypoxia. Initially found to be hypoglycemic and hypotensive requiring pressor support and received IV dextrose. Initial lactate elevated 19. Started on Zosyn for possible sepsis. Patient found to be hypothermic and put on a [employer]. Patient had PEA cardiac arrest and [facility] ER CPR performed for 10 minutes epi of 2, bicarb of 1, D50 of 1 ROSC achieved afterwards. Patient started on Levophed and vasopressin. Initial x-ray of the chest demonstrated multifocal groundglass opacities bilaterally rapid COVID-19 negative. CT PE could not be done. Cardiology consulted for likely cardiogenic shock. Past Medical History:   has a past medical history of Anemia, Atrial fibrillation ([address] Cardiac amyloidosis ([address] CHF (congestive heart failure) ([address] Decreased cardiac ejection fraction, Diabetes mellitus ([address] Hyperlipidemia, Hypertension, and Thyroid disease. Past Surgical History:   has a past surgical history that includes Total Thyroidectomy (03/10/2020); Total Thyroidectomy (03/13/2020); Knee arthroscopy (Left); and Cardiac catheterization. Home Medications:    Prior to Admission medications    Medication Sig Start Date End Date Taking?  Authorizing Provider   acetaminophen (TYLENOL) 325 MG tablet Take 650 mg by mouth every 6 hours as needed for Pain   Yes Historical Provider, MD   vitamin C (ASCORBIC ACID) 500 MG tablet Take 500 mg by mouth daily   Yes Historical Provider, MD   aspirin 81 MG chewable tablet Take 81 mg by mouth daily   Yes Historical Provider, MD   bumetanide (BUMEX) 1 MG tablet Take 1 mg by mouth 2 times daily   Yes Historical Provider, MD   cyanocobalamin 1000 MCG tablet Take 1,000 mcg by mouth daily   Yes Historical Provider, MD   insulin glargine (LANTUS) 100 UNIT/ML injection vial Inject 10 Units into the skin nightly   Yes Historical Provider, MD   levothyroxine (SYNTHROID) 112 MCG tablet Take 112 mcg by mouth Daily   Yes Historical Provider, MD   magnesium oxide (MAG-OX) 400 MG tablet Take 400 mg by mouth daily   Yes Historical Provider, MD   melatonin 3 MG TABS tablet Take 3 mg by mouth nightly as needed   Yes Historical Provider, MD   metoprolol tartrate (LOPRESSOR) 25 MG tablet Take 25 mg by mouth daily   Yes Historical Provider, MD   midodrine (PROAMATINE) 5 MG tablet Take 5 mg by mouth 3 times daily   Yes Historical Provider, MD   mirtazapine (REMERON) 15 MG tablet Take 15 mg by mouth nightly   Yes Historical Provider, MD   insulin aspart (NOVOLOG) 100 UNIT/ML injection vial Inject 6 Units into the skin 2 times daily Indications: every evening   Yes Historical Provider, MD   ondansetron (ZOFRAN) 4 MG tablet Take 4 mg by mouth every 8 hours as needed for Nausea or Vomiting   Yes Historical Provider, MD   rivaroxaban (XARELTO) 20 MG TABS tablet Take 20 mg by mouth   Yes Historical Provider, MD      Current Facility-Administered Medications: norepinephrine (LEVOPHED) 16 mg in sodium chloride 0.9 % 250 mL infusion, 2 mcg/min, Intravenous, Continuous  midazolam (VERSED) 1 mg/mL in D5W infusion, 1 mg/hr, Intravenous, Continuous  vasopressin 20 Units in dextrose 5 % 100 mL infusion, 0.03 Units/min, Intravenous, Continuous  chlorhexidine (PERIDEX) 0.12 % solution 15 mL, 15 mL, Mouth/Throat, BID  levothyroxine (SYNTHROID) injection 62.5 mcg, 62.5 mcg, Intravenous, Daily **AND** sodium chloride (PF) 0.9 % injection 5 mL, 5 mL, Intravenous, Daily  0.9 % sodium chloride bolus, 20 mL, Intravenous, Once  hydrocortisone sodium succinate PF (SOLU-CORTEF) injection 100 mg, 100 mg, Intravenous, Q8H  pantoprazole (PROTONIX) injection 40 mg, 40 mg, Intravenous, BID **AND** sodium chloride (PF) 0.9 % injection 10 mL, 10 mL, Intravenous, BID  sodium bicarbonate 150 mEq in dextrose 5 % 1,000 mL infusion, , Intravenous, Continuous  acetaminophen (TYLENOL) tablet 650 mg, 650 mg, Oral, Q6H PRN **OR** acetaminophen (TYLENOL) suppository 650 mg, 650 mg, Rectal, Q6H PRN  sodium chloride flush 0.9 % injection 10 mL, 10 mL, Intravenous, 2 times per day  sodium chloride flush 0.9 % injection 10 mL, 10 mL, Intravenous, PRN  cefepime (MAXIPIME) 1 g IVPB minibag, 1 g, Intravenous, Q24H  phenylephrine (WILMER-SYNEPHRINE) 50 mg in dextrose 5 % 250 mL infusion, 100 mcg/min, Intravenous, Continuous  glucose (GLUTOSE) 40 % oral gel 15 g, 15 g, Oral, PRN  dextrose 50 % IV solution, 12.5 g, Intravenous, PRN  glucagon (rDNA) injection 1 mg, 1 mg, Intramuscular, PRN  dextrose 5 % solution, 100 mL/hr, Intravenous, PRN  bacitracin ointment, , Topical, BID  EPINEPHrine (EPINEPHrine HCL) 5 mg in dextrose 5 % 250 mL infusion, 1 mcg/min, Intravenous, Continuous    Allergies:  Patient has no known allergies. Social History:        Family History: family history is not on file. No h/o sudden cardiac death. No for premature CAD    REVIEW OF SYSTEMS:    · Unable to obtain. PHYSICAL EXAM:      BP 89/68   Pulse 142   Temp 97.7 °F (36.5 °C) (Esophageal)   Resp 27   Ht 6' 4\" (1.93 m)   Wt 258 lb 13.1 oz (117.4 kg)   SpO2 (!) 60%   BMI 31.50 kg/m²    Constitutional and General Appearance: Intubated and sedated on Versed. HEENT: PERRL, no cervical lymphadenopathy. No masses palpable.  Normal oral mucosa  Respiratory:  · Normal excursion and expansion without use of accessory muscles  · Resp Auscultation: Good respiratory effort. No for increased work of breathing. On auscultation: clear to auscultation bilaterally  Cardiovascular:  · Irregularly irregular. Abdomen:   · No masses or tenderness  · Bowel sounds present  Extremities:  ·  No Cyanosis or Clubbing  ·  Lower extremity edema: No  ·  Skin: Warm and dry  Neurological:  · Intubated and sedated. Labs:     CBC:   Recent Labs     07/24/20  0728  07/24/20 2017 07/25/20  0438   WBC 4.7  --   --  4.5   HGB 10.9*   < > 12.2* 10.8*   HCT 38.3*   < > 43.4 38.0*     --   --  120*    < > = values in this interval not displayed. BMP:   Recent Labs     07/24/20  1613 07/25/20  0438   * 127*   K 5.1 5.0   CO2 <6* <6*   BUN 61* 60*   CREATININE 2.84* 3.08*   LABGLOM 22* 20*   GLUCOSE 94 173*     BNP: No results for input(s): BNP in the last 72 hours. PT/INR:   Recent Labs     07/24/20  1334 07/25/20  0438   PROTIME 33.6* 42.1*   INR 3.4 4.2     APTT:  Recent Labs     07/24/20 0728   APTT 87.1*     CARDIAC ENZYMES:  Recent Labs     07/24/20  0728 07/24/20  1332 07/24/20  1613   CKTOTAL 268 308 322*     FASTING LIPID PANEL:No results found for: HDL, LDLDIRECT, LDLCALC, TRIG  LIVER PROFILE:  Recent Labs     07/24/20  0330 07/25/20  0438   AST 52* 166*   ALT 15 36   LABALBU 3.6 3.6       DATA:    Diagnostics:    EKG: A. fib with RVR. Incomplete left bundle. Previous EKG in care everywhere showing no LBBB. ECHO:  EF 10%with biatrial dilatation with severely reduced RV function, severe TR, moderate MR. Cardiac cath negative in 2017 as per EMR. 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 Follows with the [facility], on metoprolol 100, losartan 50, Aldactone 25, Bumex 1 mg twice daily, amiodarone 200 daily at home. IMPRESSION & RECOMMENDATIONS:  1.  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 Currently on 4 pressors. CODE STATUS DNR CCA. Family likely to withdraw care as per nursing. Poor prognosis at this time. Supratherapeutic INR at 4.2. No AC at this time. 2. Troponin elevation likely secondary to demand ischemia severe lactic acidosis and massive GISSELLE. Flat trend. 3. Heart failure with reduced EF 10%. 4. History of heavy alcohol abuse. 5. Type 2 diabetes. Due to poor prognosis, we will hold off any active intervention at this time. Cardiology to follow peripherally. Please call us with questions. Luis Steven MD  [facility]         7/25/2020, 7:49 AM    Attending Cardiologist Addendum: I have reviewed and performed the history, physical, subjective, objective, assessment, and plan with the resident/fellow and agree with the note. I performed the history and physical personally. I have made changes to the note above as needed. Thank you for allowing me to participate in the care of this patient, please do not hesitate to call if you have any questions. Cate Aceves, [id number] [facility]  [facility]  [phone]

## 2020-07-25 NOTE — PROGRESS NOTES
Met with patient's two daughters and nephrology. Nephrology explained the patient's severe acidosis and possible dialysis. Dr. He Watson informed the daughters that the patient might not tolerate dialysis due to his low blood pressure and current pressor requirement. The daughters expressed understanding and decided to not precede with dialysis at this time.         Fidencio Paez MD  7/24/2020

## 2020-07-25 NOTE — PROGRESS NOTES
Renal Progress Note    Patient:  Venecia Benoit; 78 y.o. MRN# [id number]  Location:  [facility]/4341-  Attending:  Reyna Canales MD  Admit Date:  2020   Hospital Day: 1    Subjective   Patient was seen and examined. According to the RN and critical care a terminal extubation is planned for noon today. Objective   VS: BP 89/66   Pulse 141   Temp 97.7 °F (36.5 °C) (Esophageal)   Resp (!) 0   Ht 6' 4\" (1.93 m)   Wt 258 lb 13.1 oz (117.4 kg)   SpO2 (!) 84%   BMI 31.50 kg/m²   MAXIMUM TEMPERATURE OVER 24 HRS:  Temp (24hrs), Av.3 °F (34.6 °C), Min:92.5 °F (33.6 °C), Max:97.7 °F (36.5 °C)    24 HR BLOOD PRESSURE RANGE:  Systolic (66TNF), GEA:02 , Min:54 , MRS:808   ; Diastolic (68QXN), EKD:13, Min:27, Max:81    24 HR INTAKE/OUTPUT:      Intake/Output Summary (Last 24 hours) at 2020 1040  Last data filed at 2020 0800  Gross per 24 hour   Intake 6110.8 ml   Output 311 ml   Net 5799.8 ml     WEIGHT:   Patient Vitals for the past 96 hrs (Last 3 readings):   Weight   20 0615 258 lb 13.1 oz (117.4 kg)   20 1100 246 lb 14.6 oz (112 kg)   20 0808 215 lb (97.5 kg)       Current Medications    Scheduled Meds:    chlorhexidine  15 mL Mouth/Throat BID    levothyroxine  62.5 mcg Intravenous Daily    And    sodium chloride (PF)  5 mL Intravenous Daily    sodium chloride  20 mL Intravenous Once    hydrocortisone sodium succinate PF  100 mg Intravenous Q8H    pantoprazole  40 mg Intravenous BID    And    sodium chloride (PF)  10 mL Intravenous BID    sodium chloride flush  10 mL Intravenous 2 times per day    cefepime  1 g Intravenous Q24H    bacitracin   Topical BID       Physical Examination     General:  Intubated and sedated  Chest:   Bilateral vesicular breath sounds, no rales or wheezes. Cardiac:  Irregularly regular, S1 S2 RR, no murmurs, gallops or rubs, JVP not raised. Abdomen: Soft, non-tender, non distended, BS audible. SKIN:  No rashes, good skin turgor.   Extremities:  3+ generalized edema  Neuro:  Patient unable to follow commands. :                  Anuric    Labs      Recent Labs     07/24/20  0330 07/24/20  0728 07/24/20  1021 07/24/20  2017 07/25/20  0438   WBC 2.8* 4.7  --   --  4.5   RBC 4.14* 3.68*  --   --  3.71*   HGB 12.0* 10.9* 10.9* 12.2* 10.8*   HCT 40.9* 38.3* 37.4* 43.4 38.0*   MCV 98.9 104.1*  --   --  102.4   MCH 29.0 29.6  --   --  29.1   MCHC 29.3* 28.5  --   --  28.4   RDW 22.1* 21.8*  --   --  20.7*    150  --   --  120*   MPV 9.2 11.6  --   --  11.7      BMP:   Recent Labs     07/24/20  0728  07/24/20  0902 07/24/20  1158 07/24/20  1613 07/25/20  0438   *  --   --   --  127* 127*   K 4.8  --   --   --  5.1 5.0   CL 86*  --   --   --  85* 84*   CO2 7*  --   --   --  <6* <6*   BUN 58*  --   --   --  61* 60*   CREATININE 2.84*   < >  --  2.93* 2.84* 3.08*   GLUCOSE 208*   < > 150  --  94 173*   CALCIUM 8.6  --   --   --  9.0 8.5*    < > = values in this interval not displayed. Urinalysis/Chemistries      Lab Results   Component Value Date    NITRU NEGATIVE 07/24/2020    COLORU DARK YELLOW 07/24/2020    PHUR 5.0 07/24/2020    WBCUA 20 TO 50 07/24/2020    RBCUA 0 TO 2 07/24/2020    MUCUS NOT REPORTED 07/24/2020    TRICHOMONAS NOT REPORTED 07/24/2020    YEAST MODERATE 07/24/2020    BACTERIA None 07/24/2020    SPECGRAV 1.020 07/24/2020    LEUKOCYTESUR MODERATE 07/24/2020    UROBILINOGEN Normal 07/24/2020    BILIRUBINUR NEGATIVE  Verified by ictotest. 07/24/2020    GLUCOSEU NEGATIVE 07/24/2020    KETUA TRACE 07/24/2020    AMORPHOUS NOT REPORTED 07/24/2020       Radiology    No new imaging available    Assessment    1. Anuric ATN likely secondary to ischemia d/t cardiogenic and septic shock  2. Severe lactic acidosis  3. Hyponatremia  4. Hypertension  5. Septic shock  6. Cardiogenic shock with cardiomyopathy and EF 10% status post cardiac arrest  7. Anasarca  8. Papillary carcinoma status post resection    Plan   1.  Terminal extubation today with code status change to DNR-CC  2. No interventions planned due to plan for palliation    Nutrition      NPO      Thank you. Please call with any questions. Vivek Gonzalez. Josh LINARES CNP    [facility]. Attending Physician Statement  I have discussed the care of Lorri Perales, including pertinent history and exam findings with the NP  I had a long discussion with the family yesterday. Patient was quite hypotensive on 3 pressors. The option of CVVHD presented to them. At the same time the risk associated with further drop in blood pressure and not been able to tolerate was also explained. Family decided not to start any kind of aggressive intervention.    Josefina Landers

## 2020-07-25 NOTE — PLAN OF CARE
Problem: Confusion - Acute:  Goal: Absence of continued neurological deterioration signs and symptoms  Description: Absence of continued neurological deterioration signs and symptoms  7/25/2020 0145 by Sol Pelaez RN  Outcome: Ongoing  7/25/2020 0144 by Sol Pelaez RN  Outcome: Ongoing  7/24/2020 1822 by Lilly Cabrales RN  Outcome: Ongoing  Goal: Mental status will be restored to baseline  Description: Mental status will be restored to baseline  7/25/2020 0145 by Sol Pelaez RN  Outcome: Ongoing  7/25/2020 0144 by Sol Pelaez RN  Outcome: Ongoing  7/24/2020 1822 by Lilly Cabrales RN  Outcome: Ongoing     Problem: Injury - Risk of, Physical Injury:  Goal: Absence of physical injury  Description: Absence of physical injury  7/25/2020 0145 by Sol Pelaez RN  Outcome: Met This Shift  7/25/2020 0144 by Sol Pelaez RN  Outcome: Met This Shift  7/24/2020 1822 by Lilly Cabrales RN  Outcome: Met This Shift  Goal: Will remain free from falls  Description: Will remain free from falls  7/25/2020 0145 by Sol Pelaez RN  Outcome: Met This Shift  7/25/2020 0144 by Sol Pelaez RN  Outcome: Met This Shift  7/24/2020 1822 by Lilly Cabrales RN  Outcome: Met This Shift     Problem: Sleep Pattern Disturbance:  Goal: Appears well-rested  Description: Appears well-rested  Outcome: Ongoing     Problem: Falls - Risk of:  Goal: Absence of physical injury  Description: Absence of physical injury  7/25/2020 0145 by Sol Pelaez RN  Outcome: Met This Shift  7/25/2020 0144 by Sol Pelaez RN  Outcome: Met This Shift  7/24/2020 1822 by Lilly Cabrales RN  Outcome: Met This Shift  Goal: Will remain free from falls  Description: Will remain free from falls  7/25/2020 0145 by Sol Pelaez RN  Outcome: Met This Shift  7/25/2020 0144 by Sol Pelaez RN  Outcome: Met This Shift  7/24/2020 1822 by Lilly Cabrales RN  Outcome: Met This Shift

## 2020-07-25 NOTE — PROGRESS NOTES
Critical Care Team - Daily Progress Note      Date and time: 7/25/2020 8:48 AM  Patient's name:  Juan Luis Brannon  Medical Record Number: [id number]  Patient's account/billing number: [de-identified]  Patient's YOB: 1950  Age: 78 y.o. Date of Admission: 7/24/2020  6:55 AM  Length of stay during current admission: 1      Primary Care Physician: No primary care provider on file. ICU Attending Physician: Dr. Jose J Ordonez    Code Status: DNR-CCA    Reason for ICU admission: Cardiogenic shock requiring pressors, acute encephalopathy, acute hypoxic respiratory failure      SUBJECTIVE:     OVERNIGHT EVENTS: Overnight, patient was maxed out on vasopressin, Pete-Synephrine and Levophed. Levophed was maxed to 50. Patient was started on epinephrine. CURRENT EVALUATION:   Patient seen and examined bedside. Labs and chart reviewed. Intubated and sedated on Versed at 2. Per RN, patient's becomes uncomfortable with his breathing when off sedation. Patient comatose. PRVC mode- FiO2 100%, PEEP increased to 16, , RR 20. Patient breathing over the vent at 27. O2 saturations still at 80%. Overnight was in 62s. Afebrile. MAP 67, maxed on 4 pressors. Tachycardic at 130. Bicarb <6. Sodium 127. Creatinine trending up 3.08 today. Lactic acid persistently high in 20. Patient is anuric. Continuing on bicarb drip at 75 mill per hour. Total fluids going at 260 mL/h. Patient anuric. Anasarcous, very edematous. 4.6 L positive net since admission. Nephrology recommending CVVHD for severe acidosis but opines due to patient's hypotension requiring pressor support, patient might not tolerate dialysis at this time. Daughters were explained about the present situation. Both daughters expressed understanding and decided not to proceed with the dialysis at this time. INR 4.2. 1x vitamin K.  2 FFP's and 1 cryo. s/p      AWAKE & FOLLOWING COMMANDS:  [x] No   [] Yes    CURRENT VENTILATION STATUS:     [x] Ventilator [] BIPAP  [] Nasal Cannula [] Room Air        SECRETIONS Amount:  [] Small [x] Moderate  [] Large  [] None  Color:     [x] White [] Colored  [] Bloody    SEDATION:  RAAS Score:  [] Propofol gtt  [x] Versed gtt  [] Ativan gtt   [] No Sedation    PARALYZED:  [x] No    [] Yes    DIARRHEA:                [x] No                [] Yes  (C. Difficile status: [] positive                                                                                                                       [] negative                                                                                                                     [] pending)    VASOPRESSORS:  [] No    [x] Yes    If yes -   [x] Levophed       [] Dopamine     [x] Vasopressin       [] Dobutamine  [x] Phenylephrine         [x] Epinephrine    CENTRAL LINES:     [] No   [x] Yes      If yes -     [] Right IJ     [] Left IJ [] Right Femoral [] Left Femoral                   [x] Right Subclavian [] Left Subclavian       GILLIAM'S CATHETER:   [] No   [x] Yes  URINE OUTPUT:            [] Good   [] Low              [x] Anuric      OBJECTIVE:     VITAL SIGNS:  BP 80/61   Pulse 141   Temp 97.7 °F (36.5 °C) (Esophageal)   Resp 27   Ht 6' 4\" (1.93 m)   Wt 258 lb 13.1 oz (117.4 kg)   SpO2 (!) 60%   BMI 31.50 kg/m²   Tmax over 24 hours:  Temp (24hrs), Av.1 °F (34.5 °C), Min:92.3 °F (33.5 °C), Max:97.7 °F (36.5 °C)      Patient Vitals for the past 6 hrs:   BP Temp Temp src Pulse Resp Weight   2045 80/61 -- -- 141 27 --   20 0730 -- -- -- 142 27 --   20 0715 (!) 108/32 -- -- 142 28 --   20 0713 -- -- -- -- 27 --   20 0700 92/74 -- -- 143 28 --   20 0645 (!) 80/59 -- -- 140 (!) 7 --   2030 (!) 87/59 -- -- 140 (!) 37 --   2015 / -- -- 142 28 258 lb 13.1 oz (117.4 kg)   20 0600 -- -- -- 141 26 --   20 0545 -- -- -- 141 (!) 33 --   20 0530 (!) 77/61 -- -- 137 23 --   20 0515 84/60 -- -- 135 24 -- 07/25/20 0500 84/63 -- -- 135 24 --   07/25/20 0445 (!) 76/56 -- -- 136 27 --   07/25/20 0430 -- -- -- 139 13 --   07/25/20 0415 86/65 -- -- 135 24 --   07/25/20 0400 (!) 90/52 97.7 °F (36.5 °C) Esophageal 135 22 --   07/25/20 0345 90/73 -- -- 134 23 --   07/25/20 0330 90/64 -- -- 134 26 --   07/25/20 0325 -- -- -- 134 27 --   07/25/20 0315 84/65 -- -- 135 27 --   07/25/20 0300 92/62 -- -- 136 27 --         Intake/Output Summary (Last 24 hours) at 7/25/2020 0848  Last data filed at 7/25/2020 2105  Gross per 24 hour   Intake 4907.7 ml   Output 311 ml   Net 4596.7 ml     Wt Readings from Last 2 Encounters:   07/25/20 258 lb 13.1 oz (117.4 kg)   07/24/20 244 lb 11.4 oz (111 kg)     Body mass index is 31.5 kg/m². PHYSICAL EXAMINATION:    General appearance -comatose. Unresponsive. Does not follow commands. Intubated and sedated. Generalized anasarca. Mental status -comatose. Eyes -pupils reactive to light. No corneal reflex. Ears - bilateral TM's and external ear canals normal  Nose - normal and patent, no erythema, discharge or polyps  Mouth - mucous membranes moist, pharynx normal without lesions  Neck - supple, no significant adenopathy  Chest - clear to auscultation, no wheezes, rales or rhonchi, symmetric air entry  Heart - normal rate, regular rhythm, normal S1, S2, no murmurs, rubs, clicks or gallops  Abdomen - soft, nontender, nondistended, no masses or organomegaly  Neurological -comatose. No reflexes. Extremities -upper and lower extremities were edematous.   Skin - normal coloration and turgor, no rashes, no suspicious skin lesions noted      Any additional physical findings:    MEDICATIONS:    Scheduled Meds:   chlorhexidine  15 mL Mouth/Throat BID    levothyroxine  62.5 mcg Intravenous Daily    And    sodium chloride (PF)  5 mL Intravenous Daily    sodium chloride  20 mL Intravenous Once    hydrocortisone sodium succinate PF  100 mg Intravenous Q8H    pantoprazole  40 mg Intravenous Date    PROTIME 42.1 07/25/2020    INR 4.2 07/25/2020     PTT:    Lab Results   Component Value Date    APTT 87.1 07/24/2020       Comprehensive Metabolic Profile:   Recent Labs     07/24/20  0728  07/24/20  0902 07/24/20  1158 07/24/20  1613 07/25/20  0438   *  --   --   --  127* 127*   K 4.8  --   --   --  5.1 5.0   CL 86*  --   --   --  85* 84*   CO2 7*  --   --   --  <6* <6*   BUN 58*  --   --   --  61* 60*   CREATININE 2.84*   < >  --  2.93* 2.84* 3.08*   GLUCOSE 208*   < > 150  --  94 173*   CALCIUM 8.6  --   --   --  9.0 8.5*   PROT  --   --   --   --   --  6.3*   LABALBU  --   --   --   --   --  3.6   BILITOT  --   --   --   --   --  2.19*   ALKPHOS  --   --   --   --   --  95   AST  --   --   --   --   --  166*   ALT  --   --   --   --   --  36    < > = values in this interval not displayed. Magnesium: No results found for: MG  Phosphorus: No results found for: PHOS  Ionized Calcium: No results found for: CAION     Urinalysis:     Troponin: No results for input(s): TROPONINI in the last 72 hours. Microbiology:    Cultures during this admission:     Blood cultures:                 [] None drawn      [] Negative             []  Positive (Details:  )  Urine Culture:                   [] None drawn      [] Negative             []  Positive (Details:  )  Sputum Culture:               [] None drawn       [] Negative             []  Positive (Details:  )   Endotracheal aspirate:     [] None drawn       [] Negative             []  Positive (Details:  )     Other pertinent Labs:       Radiology/Imaging:     Chest Xray (7/25/2020):    ASSESSMENT:     · Active Problems:  ·   Septic shock ([address]  · Resolved Problems:  ·   * No resolved hospital problems. *  ·   ·         PLAN:     WEAN PER PROTOCOL:  [x] No   [] Yes  [] N/A    DISCONTINUE ANY LABS:   [x] No   [] Yes    ICU PROPHYLAXIS:  Stress ulcer:  [x] PPI Agent  [] R4Tpifp [] Sucralfate  [] Other  VTE:   [] Enoxaparin  [] Unfract.  Heparin Subcut  [x] EPC Cuffs    NUTRITION:  [x] NPO [] Tube Feeding (Specify: ) [] TPN  [] PO (Diet: Diet NPO Effective Now)    HOME MEDICATIONS RECONCILED: [] No  [x] Yes    INSULIN DRIP:   [x] No   [] Yes    CONSULTATION NEEDED:  [] No   [x] Yes    FAMILY UPDATED:    [] No   [x] Yes    TRANSFER OUT OF ICU:   [x] No   [] Yes    ADDITIONAL PLAN:    1. Cardiogenic shock complicated with multisystem organ failure  · Maxed on 4 pressors, vasopressin, Levophed, Pete-Synephrine, epinephrine  · Avoid any more fluid boluses due to CHF. · Continue empiric cefepime 1 g IV every 24 hours. · Continue Solu-Cortef 100 mg IV every 8 hours. · Continue bicarb drip at 75 ml per hour. · Telemetry monitoring.     2. Acute hypoxic respiratory failure secondary to cardiogenic shock  · Saturating 80% on FiO2 100%, PEEP 16, RR 20, . · Continue mechanical ventilation.     3. 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 · proBNP 30,000. Troponins 254. · Follow-up 2D echo. · Cardiology following. Opining poor prognosis and no active intervention at this time.     4. S/P PEA cardiac arrest  · s/p CPR for 10 minutes. Received epinephrine x2, bicarb x1, D50 x1. · CODE STATUS changed to DNR CCA.     5. Lactic acidosis secondary to shock  · Lactic acid persistently elevated. · Bicarb <6.  · Continue bicarb drip + femoral per hour. · Nephrology following. Recommending that patient needs emergent CVVHD however patient cannot tolerate dialysis with his current blood pressures. Family was explained and are agreeable to not to proceed with dialysis.     6. GISSELLE due to shock  · Renal function worsening. Anuric. · Nephrology recommending emergent CVVHD, however states that patient cannot tolerate dialysis with his current circulatory status. Family explained and they are agreeable to not to proceed with dialysis.     7. Disseminated intravascular coagulation  · S/p 2 FFP's ordered.   1 cryoprecipitate, 1 dose of vitamin K 10 mg IV. · Repeat INR today 4.5. · Continue to monitor for bleeding.     8. Hypoglycemia  · Per report, blood glucose initially was 40s. Improved with dextrose. · The glucose within normal range. · Hypoglycemia protocol.     9. H/o Atrial fibrillation with RVR  · Currently in sinus tach. · Was on Xarelto for anticoagulation at home. · Avoid anticoagulation as patient is in DIC.     10. Hypothyroidism secondary to thyroidectomy  · s/p thyroidectomy. On 112 mcg levothyroxine daily at home. · TSH 29, total T4 4.4.  · Continue IV 62.5 mcg daily.     11. Ulcer prophylaxis: On Protonix IV 40 mg twice daily. 12. DVT prophylaxis: EPC cuffs. No anticoagulation due to DIC. CODE STATUS: DNR CCA. Discussed with with 1 of the patient's daughter Gilberto Lloyd who is at bedside, states that family's willing to change CODE STATUS to DNR CC and terminal extubation around noon today. Reesa Buerger, M.D. Critical care resident,  Department of Internal Medicine/ Critical care  [facility]             7/25/2020, 8:48 AM    Attending Physician Statement  I have discussed the care of Rodrick Howard, including pertinent history and exam findings with the resident. I have reviewed the key elements of all parts of the encounter with the resident. I have seen and examined the patient with the resident. I agree with the assessment and plan and status of the problem list as documented. Please see full note by critical care resident.     I have reviewed the chart, events from yesterday seen, I have reviewed the labs, arterial blood gases and ventilator setting seen.   He is continued on pressors and required increased dosing of pressors he is on maximum dose of vasopressin, Levophed, Pete-Synephrine, and epinephrine remained tachycardic with sinus tachycardia, when I saw him this morning he was not responsive although he require small dose of Versed drip as he was more tachypneic when he is off Versed. His lactic acid remains high and remains over 20 he is on bicarbonate drip his bicarbonate is less than 6 continue with severely acidotic with with VBG this morning 7.0 3/34/44/9, oxygen saturation reported to be low to mid 80s sometimes 70% 100% FiO2 ventilator setting PRVC/20/610/16/100 percent FiO2. Other labs shows INR of 4.2 sodium is 127 BUN increased to 3.08 BUN is 60 potassium is 5.0 he has no to minimal urine output, WBC count is 4.5 hemoglobin 10.8 and platelet count 790. He had received 1 cryo 2 fresh frozen plasma and vitamin K yesterday INR today is 4.2. He remains severely anasarca with edema of all extremities and abdominal wall edema, echocardiogram was reported which shows severely reduced LV function as before less than 10%. His overall prognosis is grim and very poor with multiorgan failure secondary to cardiogenic shock secondary to severe cardiomyopathy and anasarca. 2 daughters have decided to change the CODE STATUS to DNR CCA yesterday and this afternoon 2 daughters will meet and possibly withdrawing support, have discussed with 1 of the daughter and I have answered all question she has an updated.     Discussed with nursing staff, treatment plan discussed with        Total critical care time caring for this patient with life threatening, unstable organ failure, including direct patient contact, management of life support systems, review of data including imaging and labs, discussions with other team members and physicians at least 28  Min so far today, excluding procedures.        Please note that this chart was generated using voice recognition Dragon dictation software.  Although every effort was made to ensure the accuracy of this automated transcription, some errors in transcription may have occurred.        Trenton Greene MD  7/25/2020 9:24 AM

## 2020-07-26 LAB
CULTURE: ABNORMAL
CULTURE: NORMAL
DIRECT EXAM: NORMAL
EKG ATRIAL RATE: 90 BPM
EKG Q-T INTERVAL: 376 MS
EKG QRS DURATION: 116 MS
EKG QTC CALCULATION (BAZETT): 487 MS
EKG R AXIS: -42 DEGREES
EKG T AXIS: 147 DEGREES
EKG VENTRICULAR RATE: 101 BPM
Lab: ABNORMAL
Lab: NORMAL
SPECIMEN DESCRIPTION: ABNORMAL
SPECIMEN DESCRIPTION: NORMAL

## 2020-07-29 ENCOUNTER — TELEPHONE (OUTPATIENT)
Dept: PULMONOLOGY | Age: 70
End: 2020-07-29

## 2020-07-29 NOTE — TELEPHONE ENCOUNTER
Doc Juarez called office, left message asking if Dr Nicholas Hernandez would sign death cert. Called [facility] back, informed Lynda that he will, provided my Email.

## 2020-07-30 LAB
CULTURE: NORMAL
Lab: NORMAL
SPECIMEN DESCRIPTION: NORMAL

## 2020-07-30 NOTE — TELEPHONE ENCOUNTER
Jocelyne Mott - he e-mailed - placed on Dr Sandra Kingston desk with prog. Note. Dr Jong Gupta, if I am not in the office on Friday, will you please give signed copy to Carl Rivera. Thanks.

## 2020-08-06 NOTE — DISCHARGE SUMMARY
opines patient cannot tolerate dialysis with current hemodynamic status. 2 daughters were explained about the patient's medical status in detail. They decided to change the CODE STATUS to DNR CCA and then to DNR CC and patient was terminally extubated on 2020. Patient  and death announced at 1:56 PM on 2020.     Consults:   nephrology    Any Hospital Acquired Infections: None    PATIENT'S DISCHARGE CONDITION:      PATIENT/FAMILY INSTRUCTIONS:   Discharge Medication List as of 2020  4:39 PM      CONTINUE these medications which have NOT CHANGED    Details   acetaminophen (TYLENOL) 325 MG tablet Take 650 mg by mouth every 6 hours as needed for PainHistorical Med      vitamin C (ASCORBIC ACID) 500 MG tablet Take 500 mg by mouth dailyHistorical Med      aspirin 81 MG chewable tablet Take 81 mg by mouth dailyHistorical Med      bumetanide (BUMEX) 1 MG tablet Take 1 mg by mouth 2 times dailyHistorical Med      cyanocobalamin 1000 MCG tablet Take 1,000 mcg by mouth dailyHistorical Med      insulin glargine (LANTUS) 100 UNIT/ML injection vial Inject 10 Units into the skin nightlyHistorical Med      levothyroxine (SYNTHROID) 112 MCG tablet Take 112 mcg by mouth DailyHistorical Med      magnesium oxide (MAG-OX) 400 MG tablet Take 400 mg by mouth dailyHistorical Med      melatonin 3 MG TABS tablet Take 3 mg by mouth nightly as neededHistorical Med      metoprolol tartrate (LOPRESSOR) 25 MG tablet Take 25 mg by mouth dailyHistorical Med      midodrine (PROAMATINE) 5 MG tablet Take 5 mg by mouth 3 times dailyHistorical Med      mirtazapine (REMERON) 15 MG tablet Take 15 mg by mouth nightlyHistorical Med      insulin aspart (NOVOLOG) 100 UNIT/ML injection vial Inject 6 Units into the skin 2 times daily Indications: every eveningHistorical Med      ondansetron (ZOFRAN) 4 MG tablet Take 4 mg by mouth every 8 hours as needed for Nausea or VomitingHistorical Med      rivaroxaban (XARELTO) 20 MG TABS tablet

## 2021-09-07 NOTE — PROGRESS NOTES
I agree with the [facility]     Renal Dose Adjustment    Carlos Llanos is a 78 y.o. male. Pharmacist assessment of renally cleared medications. Recent Labs     07/24/20  0330 07/24/20  0728   BUN 63* 58*       Recent Labs     07/24/20  0728 07/24/20  0740   CREATININE 2.84* 3.19*       Estimated Creatinine Clearance: 27 mL/min (A) (based on SCr of 3.19 mg/dL (H)). Height:   Ht Readings from Last 1 Encounters:   07/24/20 6' 4\" (1.93 m)     Weight:  Wt Readings from Last 1 Encounters:   07/24/20 215 lb (97.5 kg)       The following medication dose has been adjusted based upon renal function per P&T Guidelines:             Decrease Pepcid IV to 20 mg daily. Thank you. Will continue to follow.   Francisco Ramirez, PharmD